# Patient Record
Sex: FEMALE | Race: WHITE | NOT HISPANIC OR LATINO | Employment: OTHER
[De-identification: names, ages, dates, MRNs, and addresses within clinical notes are randomized per-mention and may not be internally consistent; named-entity substitution may affect disease eponyms.]

---

## 2017-02-13 ENCOUNTER — BH HISTORICAL (OUTPATIENT)
Dept: OTHER | Age: 62
End: 2017-02-13

## 2017-08-14 ENCOUNTER — BH HISTORICAL (OUTPATIENT)
Dept: OTHER | Age: 62
End: 2017-08-14

## 2018-02-12 ENCOUNTER — BH HISTORICAL (OUTPATIENT)
Dept: OTHER | Age: 63
End: 2018-02-12

## 2018-08-28 ENCOUNTER — BH HISTORICAL (OUTPATIENT)
Dept: OTHER | Age: 63
End: 2018-08-28

## 2019-01-24 RX ORDER — OXCARBAZEPINE 150 MG/1
TABLET, FILM COATED ORAL
COMMUNITY
Start: 2018-08-09 | End: 2019-02-05 | Stop reason: SDUPTHER

## 2019-01-24 RX ORDER — BUPROPION HYDROCHLORIDE 300 MG/1
TABLET ORAL
COMMUNITY
Start: 2018-08-28 | End: 2019-02-26 | Stop reason: SDUPTHER

## 2019-02-05 ENCOUNTER — TELEPHONE (OUTPATIENT)
Dept: BEHAVIORAL HEALTH | Age: 64
End: 2019-02-05

## 2019-02-05 RX ORDER — OXCARBAZEPINE 150 MG/1
150 TABLET, FILM COATED ORAL 2 TIMES DAILY
Qty: 60 TABLET | Refills: 0 | Status: SHIPPED | OUTPATIENT
Start: 2019-02-05 | End: 2019-02-26 | Stop reason: SDUPTHER

## 2019-02-26 ENCOUNTER — BEHAVIORAL HEALTH (OUTPATIENT)
Dept: BEHAVIORAL HEALTH | Age: 64
End: 2019-02-26

## 2019-02-26 DIAGNOSIS — F41.1 GENERALIZED ANXIETY DISORDER: Primary | ICD-10-CM

## 2019-02-26 DIAGNOSIS — F33.41 RECURRENT MAJOR DEPRESSIVE DISORDER, IN PARTIAL REMISSION (CMD): ICD-10-CM

## 2019-02-26 PROCEDURE — 99213 OFFICE O/P EST LOW 20 MIN: CPT | Performed by: PSYCHIATRY & NEUROLOGY

## 2019-02-26 RX ORDER — OXCARBAZEPINE 150 MG/1
150 TABLET, FILM COATED ORAL 2 TIMES DAILY
Qty: 180 TABLET | Refills: 1 | Status: SHIPPED | OUTPATIENT
Start: 2019-02-26 | End: 2019-05-27

## 2019-02-26 RX ORDER — BUPROPION HYDROCHLORIDE 300 MG/1
300 TABLET ORAL EVERY MORNING
Qty: 90 TABLET | Refills: 1 | Status: SHIPPED | OUTPATIENT
Start: 2019-02-26 | End: 2019-08-20 | Stop reason: SDUPTHER

## 2019-05-23 ENCOUNTER — APPOINTMENT (OUTPATIENT)
Dept: BEHAVIORAL HEALTH | Age: 64
End: 2019-05-23

## 2019-08-14 ENCOUNTER — TELEPHONE (OUTPATIENT)
Dept: BEHAVIORAL HEALTH | Age: 64
End: 2019-08-14

## 2019-08-14 RX ORDER — OXCARBAZEPINE 150 MG/1
150 TABLET, FILM COATED ORAL 2 TIMES DAILY
Qty: 180 TABLET | Refills: 0 | Status: SHIPPED | OUTPATIENT
Start: 2019-08-14 | End: 2019-08-20 | Stop reason: SDUPTHER

## 2019-08-15 ENCOUNTER — TELEPHONE (OUTPATIENT)
Dept: BEHAVIORAL HEALTH | Age: 64
End: 2019-08-15

## 2019-08-20 ENCOUNTER — OFFICE VISIT (OUTPATIENT)
Dept: BEHAVIORAL HEALTH | Age: 64
End: 2019-08-20

## 2019-08-20 DIAGNOSIS — F31.76 BIPOLAR DISORDER, IN FULL REMISSION, MOST RECENT EPISODE DEPRESSED (CMD): Primary | ICD-10-CM

## 2019-08-20 PROCEDURE — 99213 OFFICE O/P EST LOW 20 MIN: CPT | Performed by: PSYCHIATRY & NEUROLOGY

## 2019-08-20 RX ORDER — OXCARBAZEPINE 150 MG/1
150 TABLET, FILM COATED ORAL 2 TIMES DAILY
Qty: 180 TABLET | Refills: 1 | Status: SHIPPED | OUTPATIENT
Start: 2019-08-20 | End: 2020-02-20 | Stop reason: SDUPTHER

## 2019-08-20 RX ORDER — BUPROPION HYDROCHLORIDE 300 MG/1
300 TABLET ORAL EVERY MORNING
Qty: 90 TABLET | Refills: 1 | Status: SHIPPED | OUTPATIENT
Start: 2019-08-20 | End: 2020-02-20 | Stop reason: SDUPTHER

## 2019-10-29 ENCOUNTER — OFFICE VISIT (OUTPATIENT)
Dept: FAMILY MEDICINE CLINIC | Facility: CLINIC | Age: 64
End: 2019-10-29
Payer: COMMERCIAL

## 2019-10-29 VITALS
WEIGHT: 197.81 LBS | DIASTOLIC BLOOD PRESSURE: 83 MMHG | HEIGHT: 65.5 IN | HEART RATE: 101 BPM | SYSTOLIC BLOOD PRESSURE: 137 MMHG | BODY MASS INDEX: 32.56 KG/M2 | TEMPERATURE: 98 F

## 2019-10-29 DIAGNOSIS — N18.30 CHRONIC KIDNEY DISEASE (CKD), STAGE III (MODERATE) (HCC): ICD-10-CM

## 2019-10-29 DIAGNOSIS — E28.39 ESTROGEN DEFICIENCY: ICD-10-CM

## 2019-10-29 DIAGNOSIS — I87.2 VENOUS INSUFFICIENCY OF BOTH LOWER EXTREMITIES: ICD-10-CM

## 2019-10-29 DIAGNOSIS — I10 ESSENTIAL HYPERTENSION: ICD-10-CM

## 2019-10-29 DIAGNOSIS — J30.89 ENVIRONMENTAL AND SEASONAL ALLERGIES: Primary | ICD-10-CM

## 2019-10-29 DIAGNOSIS — F31.70 BIPOLAR DISORDER IN FULL REMISSION, MOST RECENT EPISODE UNSPECIFIED TYPE (HCC): ICD-10-CM

## 2019-10-29 PROCEDURE — 99203 OFFICE O/P NEW LOW 30 MIN: CPT | Performed by: FAMILY MEDICINE

## 2019-10-29 RX ORDER — AMLODIPINE BESYLATE AND BENAZEPRIL HYDROCHLORIDE 10; 20 MG/1; MG/1
1 CAPSULE ORAL DAILY
COMMUNITY
Start: 2019-09-18 | End: 2019-12-03

## 2019-10-29 RX ORDER — CETIRIZINE HYDROCHLORIDE 10 MG/1
10 TABLET ORAL DAILY
COMMUNITY
Start: 2017-09-06

## 2019-10-29 RX ORDER — BUPROPION HYDROCHLORIDE 150 MG/1
150 TABLET ORAL DAILY
COMMUNITY
Start: 2019-07-23

## 2019-10-29 RX ORDER — MONTELUKAST SODIUM 10 MG/1
1 TABLET ORAL NIGHTLY
COMMUNITY
End: 2019-12-03

## 2019-10-29 RX ORDER — OXCARBAZEPINE 150 MG/1
150 TABLET, FILM COATED ORAL 2 TIMES DAILY
COMMUNITY
Start: 2019-07-23

## 2019-12-03 RX ORDER — MONTELUKAST SODIUM 10 MG/1
10 TABLET ORAL NIGHTLY
Qty: 90 TABLET | Refills: 1 | Status: SHIPPED | OUTPATIENT
Start: 2019-12-03 | End: 2020-06-17

## 2019-12-03 NOTE — TELEPHONE ENCOUNTER
Patient requesting refill for medication below and states out of medication and requesting refills on both medications. •  amLODIPine Besy-Benazepril HCl 10-20 MG Oral Cap, Take 1 capsule by mouth daily. , Disp: , Rfl:   •  Montelukast Sodium 10 MG Oral

## 2019-12-04 RX ORDER — AMLODIPINE BESYLATE AND BENAZEPRIL HYDROCHLORIDE 10; 20 MG/1; MG/1
1 CAPSULE ORAL DAILY
Qty: 90 CAPSULE | Refills: 1 | Status: SHIPPED | OUTPATIENT
Start: 2019-12-04 | End: 2020-07-21

## 2019-12-04 NOTE — TELEPHONE ENCOUNTER
Sent to [facility] out-of-office    Please review; protocol failed. D/t labs  Requested Prescriptions     Pending Prescriptions Disp Refills   • amLODIPine Besy-Benazepril HCl 10-20 MG Oral Cap  0     Sig: Take 1 capsule by mouth daily.    • Montelukast Sodium

## 2020-01-07 ENCOUNTER — OFFICE VISIT (OUTPATIENT)
Dept: FAMILY MEDICINE CLINIC | Facility: CLINIC | Age: 65
End: 2020-01-07
Payer: COMMERCIAL

## 2020-01-07 VITALS
RESPIRATION RATE: 19 BRPM | TEMPERATURE: 97 F | WEIGHT: 195 LBS | HEART RATE: 96 BPM | HEIGHT: 66 IN | DIASTOLIC BLOOD PRESSURE: 84 MMHG | SYSTOLIC BLOOD PRESSURE: 139 MMHG | BODY MASS INDEX: 31.34 KG/M2

## 2020-01-07 DIAGNOSIS — H92.02 LEFT EAR PAIN: Primary | ICD-10-CM

## 2020-01-07 DIAGNOSIS — M26.629 TMJ SYNDROME: ICD-10-CM

## 2020-01-07 DIAGNOSIS — H93.19 TINNITUS, UNSPECIFIED LATERALITY: ICD-10-CM

## 2020-01-07 PROCEDURE — 99213 OFFICE O/P EST LOW 20 MIN: CPT | Performed by: FAMILY MEDICINE

## 2020-01-07 RX ORDER — METHYLPREDNISOLONE 4 MG/1
TABLET ORAL
Qty: 1 KIT | Refills: 0 | Status: SHIPPED | OUTPATIENT
Start: 2020-01-07 | End: 2020-02-27 | Stop reason: ALTCHOICE

## 2020-01-07 RX ORDER — FLUTICASONE PROPIONATE 50 MCG
SPRAY, SUSPENSION (ML) NASAL DAILY
COMMUNITY

## 2020-01-07 NOTE — PROGRESS NOTES
HPI:    Loyda Beltran is a 60year old female presents to clinic with left-sided ear pain that started on 12/15/2019. Patient was singing in a concert, when the trombone was loudly playing next to her and she started to experience ear pain.   Reports that ear and ear canal normal.   Nose: Nose normal.   Mouth/Throat: Uvula is midline, oropharynx is clear and moist and mucous membranes are normal.   Eyes: Pupils are equal, round, and reactive to light.  Conjunctivae and EOM are normal.   Neck: Normal range of

## 2020-01-09 ENCOUNTER — OFFICE VISIT (OUTPATIENT)
Dept: OTOLARYNGOLOGY | Facility: CLINIC | Age: 65
End: 2020-01-09
Payer: COMMERCIAL

## 2020-01-09 VITALS
SYSTOLIC BLOOD PRESSURE: 138 MMHG | WEIGHT: 195 LBS | BODY MASS INDEX: 31.34 KG/M2 | TEMPERATURE: 97 F | HEIGHT: 66 IN | DIASTOLIC BLOOD PRESSURE: 84 MMHG

## 2020-01-09 DIAGNOSIS — H92.02 LEFT EAR PAIN: Primary | ICD-10-CM

## 2020-01-09 PROCEDURE — 99243 OFF/OP CNSLTJ NEW/EST LOW 30: CPT | Performed by: OTOLARYNGOLOGY

## 2020-01-09 RX ORDER — MELOXICAM 15 MG/1
15 TABLET ORAL DAILY
Qty: 30 TABLET | Refills: 3 | Status: SHIPPED | OUTPATIENT
Start: 2020-01-09 | End: 2020-08-04 | Stop reason: ALTCHOICE

## 2020-01-09 RX ORDER — CYCLOBENZAPRINE HCL 5 MG
5 TABLET ORAL 3 TIMES DAILY PRN
Qty: 90 TABLET | Refills: 0 | Status: SHIPPED | OUTPATIENT
Start: 2020-01-09 | End: 2020-02-27 | Stop reason: ALTCHOICE

## 2020-01-09 NOTE — PROGRESS NOTES
Enoch Marty is a 60year old female. Patient presents with:  Ear Problem: c/o left ear pain for 3 weeks, ringing in left ear for 1 year      HISTORY OF PRESENT ILLNESS  She presents with 2 complaints. She has had ringing in her left ear for about 1 year. m)   Wt 195 lb (88.5 kg)   BMI 31.47 kg/m²        Constitutional Normal Overall appearance - Normal.   Psychiatric Normal Orientation - Oriented to time, place, person & situation. Appropriate mood and affect.    Neck Exam Normal Inspection - Normal. Palpat 24 Hr, Take 150 mg by mouth daily. , Disp: , Rfl:   •  cetirizine 10 MG Oral Tab, Take 10 mg by mouth daily. , Disp: , Rfl:   •  OXcarbazepine 150 MG Oral Tab, Take 150 mg by mouth 2 (two) times daily. , Disp: , Rfl:   •  methylPREDNISolone (MEDROL) 4 MG Oral

## 2020-02-13 ENCOUNTER — TELEPHONE (OUTPATIENT)
Dept: OTOLARYNGOLOGY | Facility: CLINIC | Age: 65
End: 2020-02-13

## 2020-02-13 DIAGNOSIS — H92.02 LEFT EAR PAIN: Primary | ICD-10-CM

## 2020-02-20 ENCOUNTER — OFFICE VISIT (OUTPATIENT)
Dept: BEHAVIORAL HEALTH | Age: 65
End: 2020-02-20

## 2020-02-20 DIAGNOSIS — F31.63 BIPOLAR 1 DISORDER, MIXED, SEVERE (CMD): Primary | ICD-10-CM

## 2020-02-20 DIAGNOSIS — E66.9 OBESITY (BMI 30-39.9): ICD-10-CM

## 2020-02-20 PROCEDURE — 99214 OFFICE O/P EST MOD 30 MIN: CPT | Performed by: PSYCHIATRY & NEUROLOGY

## 2020-02-20 RX ORDER — OXCARBAZEPINE 150 MG/1
150 TABLET, FILM COATED ORAL 2 TIMES DAILY
Qty: 180 TABLET | Refills: 1 | Status: SHIPPED | OUTPATIENT
Start: 2020-02-20 | End: 2020-02-25 | Stop reason: SDUPTHER

## 2020-02-20 RX ORDER — BUPROPION HYDROCHLORIDE 150 MG/1
150 TABLET ORAL DAILY
Qty: 90 TABLET | Refills: 1 | Status: SHIPPED | OUTPATIENT
Start: 2020-02-20 | End: 2020-02-25 | Stop reason: SDUPTHER

## 2020-02-20 RX ORDER — BUPROPION HYDROCHLORIDE 150 MG/1
150 TABLET ORAL
COMMUNITY
Start: 2019-07-23 | End: 2020-02-20 | Stop reason: SDUPTHER

## 2020-02-24 NOTE — TELEPHONE ENCOUNTER
LMTCB-Prior auth approved for hearing test, [id number] (valid from 02/13/20-3/11/20).  Please help pt schedule

## 2020-02-25 ENCOUNTER — TELEPHONE (OUTPATIENT)
Dept: BEHAVIORAL HEALTH | Age: 65
End: 2020-02-25

## 2020-02-25 RX ORDER — BUPROPION HYDROCHLORIDE 150 MG/1
150 TABLET ORAL DAILY
Qty: 90 TABLET | Refills: 1 | Status: SHIPPED | OUTPATIENT
Start: 2020-02-25 | End: 2020-07-30 | Stop reason: SDUPTHER

## 2020-02-25 RX ORDER — OXCARBAZEPINE 150 MG/1
150 TABLET, FILM COATED ORAL 2 TIMES DAILY
Qty: 180 TABLET | Refills: 1 | Status: SHIPPED | OUTPATIENT
Start: 2020-02-25 | End: 2020-07-30 | Stop reason: SDUPTHER

## 2020-02-27 ENCOUNTER — OFFICE VISIT (OUTPATIENT)
Dept: FAMILY MEDICINE CLINIC | Facility: CLINIC | Age: 65
End: 2020-02-27
Payer: COMMERCIAL

## 2020-02-27 VITALS
SYSTOLIC BLOOD PRESSURE: 132 MMHG | BODY MASS INDEX: 33.19 KG/M2 | HEART RATE: 103 BPM | TEMPERATURE: 97 F | DIASTOLIC BLOOD PRESSURE: 85 MMHG | RESPIRATION RATE: 20 BRPM | WEIGHT: 201.63 LBS | HEIGHT: 65.25 IN

## 2020-02-27 DIAGNOSIS — Z12.11 COLON CANCER SCREENING: ICD-10-CM

## 2020-02-27 DIAGNOSIS — H53.452 LOSS OF PERIPHERAL VISUAL FIELD, LEFT: ICD-10-CM

## 2020-02-27 DIAGNOSIS — Z00.00 ROUTINE PHYSICAL EXAMINATION: Primary | ICD-10-CM

## 2020-02-27 DIAGNOSIS — Z12.31 ENCOUNTER FOR SCREENING MAMMOGRAM FOR BREAST CANCER: ICD-10-CM

## 2020-02-27 DIAGNOSIS — H93.19 TINNITUS, UNSPECIFIED LATERALITY: ICD-10-CM

## 2020-02-27 DIAGNOSIS — H57.12 EYE PAIN, LEFT: ICD-10-CM

## 2020-02-27 DIAGNOSIS — M26.629 TMJ SYNDROME: ICD-10-CM

## 2020-02-27 DIAGNOSIS — F31.70 BIPOLAR DISORDER IN FULL REMISSION, MOST RECENT EPISODE UNSPECIFIED TYPE (HCC): ICD-10-CM

## 2020-02-27 PROCEDURE — 99396 PREV VISIT EST AGE 40-64: CPT | Performed by: FAMILY MEDICINE

## 2020-02-27 NOTE — PROGRESS NOTES
HPI:    Patient ID: Leonel Payan is a 60year old female. HPI    Review of Systems   Constitutional: Negative. HENT: Positive for tinnitus. Eyes: Positive for pain and visual disturbance. Respiratory: Negative. Cardiovascular: Negative.     Gas daughter. Exercises with walking. Will return for fasting labs. Recommend Shingrix vaccine but patient will check on insurance coverage.     Encounter for screening mammogram for breast cancer    Colon cancer screening–reviewed pros and cons of FIT testi

## 2020-03-03 ENCOUNTER — TELEPHONE (OUTPATIENT)
Dept: FAMILY MEDICINE CLINIC | Facility: CLINIC | Age: 65
End: 2020-03-03

## 2020-03-03 ENCOUNTER — TELEPHONE (OUTPATIENT)
Dept: OPHTHALMOLOGY | Facility: CLINIC | Age: 65
End: 2020-03-03

## 2020-03-03 DIAGNOSIS — H57.12 EYE PAIN, LEFT: Primary | ICD-10-CM

## 2020-03-03 NOTE — TELEPHONE ENCOUNTER
Patient stated   Pallavi Horvath MD     Does not accept her insurance, so she will need a new referral to see Dr. Farzana Michelle. Patient has an appointment with Dr. Farzana Michelle on Thursday 3/5. Thank you.

## 2020-03-03 NOTE — TELEPHONE ENCOUNTER
Dr. Porter Ritchie, patient is requesting a referral for Dr. Connor Womack. Referral has been pended, please advise.      Please reply to pool: EM TRIAGE SUPPORT

## 2020-03-03 NOTE — TELEPHONE ENCOUNTER
Spoke with patient. She feels like she is losing her vision in the left peripherally for the past 1 month. She also pain and dryness in the left eye for the past 1 year.   She complains of \"constant pain\" in and around OS of a 5/10 for the past 1 year

## 2020-03-04 NOTE — TELEPHONE ENCOUNTER
Left detailed message informing patient of order placed. Left call back number if further assistance needed.

## 2020-03-05 ENCOUNTER — OFFICE VISIT (OUTPATIENT)
Dept: AUDIOLOGY | Facility: CLINIC | Age: 65
End: 2020-03-05
Payer: COMMERCIAL

## 2020-03-05 ENCOUNTER — OFFICE VISIT (OUTPATIENT)
Dept: OPHTHALMOLOGY | Facility: CLINIC | Age: 65
End: 2020-03-05
Payer: COMMERCIAL

## 2020-03-05 ENCOUNTER — LAB ENCOUNTER (OUTPATIENT)
Dept: LAB | Facility: HOSPITAL | Age: 65
End: 2020-03-05
Attending: FAMILY MEDICINE
Payer: COMMERCIAL

## 2020-03-05 DIAGNOSIS — H04.123 BILATERAL DRY EYES: Primary | ICD-10-CM

## 2020-03-05 DIAGNOSIS — Z96.1 PSEUDOPHAKIA OF BOTH EYES: ICD-10-CM

## 2020-03-05 DIAGNOSIS — H93.12 TINNITUS OF LEFT EAR: Primary | ICD-10-CM

## 2020-03-05 DIAGNOSIS — Z00.00 ROUTINE PHYSICAL EXAMINATION: ICD-10-CM

## 2020-03-05 DIAGNOSIS — H53.10 SUBJECTIVE VISUAL DISTURBANCE OF BOTH EYES: ICD-10-CM

## 2020-03-05 DIAGNOSIS — H26.493 AFTER CATARACT NOT OBSCURING VISION, BILATERAL: ICD-10-CM

## 2020-03-05 DIAGNOSIS — H43.393 FLOATER, VITREOUS, BILATERAL: ICD-10-CM

## 2020-03-05 DIAGNOSIS — F31.70 BIPOLAR DISORDER IN FULL REMISSION, MOST RECENT EPISODE UNSPECIFIED TYPE (HCC): ICD-10-CM

## 2020-03-05 PROBLEM — H93.13 TINNITUS OF BOTH EARS: Status: ACTIVE | Noted: 2020-03-05

## 2020-03-05 LAB
ALBUMIN SERPL-MCNC: 3.8 G/DL (ref 3.4–5)
ALBUMIN/GLOB SERPL: 1.2 {RATIO} (ref 1–2)
ALP LIVER SERPL-CCNC: 102 U/L (ref 50–130)
ALT SERPL-CCNC: 29 U/L (ref 13–56)
ANION GAP SERPL CALC-SCNC: 6 MMOL/L (ref 0–18)
AST SERPL-CCNC: 20 U/L (ref 15–37)
BASOPHILS # BLD AUTO: 0.05 X10(3) UL (ref 0–0.2)
BASOPHILS NFR BLD AUTO: 0.8 %
BILIRUB SERPL-MCNC: 0.6 MG/DL (ref 0.1–2)
BUN BLD-MCNC: 26 MG/DL (ref 7–18)
BUN/CREAT SERPL: 21.1 (ref 10–20)
CALCIUM BLD-MCNC: 9.9 MG/DL (ref 8.5–10.1)
CHLORIDE SERPL-SCNC: 108 MMOL/L (ref 98–112)
CHOLEST SMN-MCNC: 250 MG/DL (ref ?–200)
CO2 SERPL-SCNC: 26 MMOL/L (ref 21–32)
CREAT BLD-MCNC: 1.23 MG/DL (ref 0.55–1.02)
DEPRECATED RDW RBC AUTO: 41 FL (ref 35.1–46.3)
EOSINOPHIL # BLD AUTO: 0.18 X10(3) UL (ref 0–0.7)
EOSINOPHIL NFR BLD AUTO: 2.9 %
ERYTHROCYTE [DISTWIDTH] IN BLOOD BY AUTOMATED COUNT: 12.8 % (ref 11–15)
GLOBULIN PLAS-MCNC: 3.3 G/DL (ref 2.8–4.4)
GLUCOSE BLD-MCNC: 78 MG/DL (ref 70–99)
HCT VFR BLD AUTO: 40 % (ref 35–48)
HDLC SERPL-MCNC: 72 MG/DL (ref 40–59)
HGB BLD-MCNC: 13.6 G/DL (ref 12–16)
IMM GRANULOCYTES # BLD AUTO: 0.02 X10(3) UL (ref 0–1)
IMM GRANULOCYTES NFR BLD: 0.3 %
LDLC SERPL CALC-MCNC: 161 MG/DL (ref ?–100)
LYMPHOCYTES # BLD AUTO: 1.92 X10(3) UL (ref 1–4)
LYMPHOCYTES NFR BLD AUTO: 31.1 %
M PROTEIN MFR SERPL ELPH: 7.1 G/DL (ref 6.4–8.2)
MCH RBC QN AUTO: 29.9 PG (ref 26–34)
MCHC RBC AUTO-ENTMCNC: 34 G/DL (ref 31–37)
MCV RBC AUTO: 87.9 FL (ref 80–100)
MONOCYTES # BLD AUTO: 0.6 X10(3) UL (ref 0.1–1)
MONOCYTES NFR BLD AUTO: 9.7 %
NEUTROPHILS # BLD AUTO: 3.4 X10 (3) UL (ref 1.5–7.7)
NEUTROPHILS # BLD AUTO: 3.4 X10(3) UL (ref 1.5–7.7)
NEUTROPHILS NFR BLD AUTO: 55.2 %
NONHDLC SERPL-MCNC: 178 MG/DL (ref ?–130)
OSMOLALITY SERPL CALC.SUM OF ELEC: 294 MOSM/KG (ref 275–295)
PATIENT FASTING Y/N/NP: YES
PATIENT FASTING Y/N/NP: YES
PLATELET # BLD AUTO: 275 10(3)UL (ref 150–450)
POTASSIUM SERPL-SCNC: 4.2 MMOL/L (ref 3.5–5.1)
RBC # BLD AUTO: 4.55 X10(6)UL (ref 3.8–5.3)
SODIUM SERPL-SCNC: 140 MMOL/L (ref 136–145)
TRIGL SERPL-MCNC: 83 MG/DL (ref 30–149)
TSI SER-ACNC: 2.26 MIU/ML (ref 0.36–3.74)
VLDLC SERPL CALC-MCNC: 17 MG/DL (ref 0–30)
WBC # BLD AUTO: 6.2 X10(3) UL (ref 4–11)

## 2020-03-05 PROCEDURE — 99243 OFF/OP CNSLTJ NEW/EST LOW 30: CPT | Performed by: OPHTHALMOLOGY

## 2020-03-05 PROCEDURE — 92015 DETERMINE REFRACTIVE STATE: CPT | Performed by: OPHTHALMOLOGY

## 2020-03-05 PROCEDURE — 84443 ASSAY THYROID STIM HORMONE: CPT

## 2020-03-05 PROCEDURE — 92567 TYMPANOMETRY: CPT | Performed by: AUDIOLOGIST

## 2020-03-05 PROCEDURE — 92557 COMPREHENSIVE HEARING TEST: CPT | Performed by: AUDIOLOGIST

## 2020-03-05 PROCEDURE — 36415 COLL VENOUS BLD VENIPUNCTURE: CPT

## 2020-03-05 PROCEDURE — 80061 LIPID PANEL: CPT

## 2020-03-05 PROCEDURE — 80053 COMPREHEN METABOLIC PANEL: CPT

## 2020-03-05 PROCEDURE — 85025 COMPLETE CBC W/AUTO DIFF WBC: CPT

## 2020-03-05 NOTE — ASSESSMENT & PLAN NOTE
Due to patient feeling like she has lost peripheral vision in the left eye, will have patient back in the near future for a 120 point visual field screening with no MD.  If 120 point is normal then no further testing, if abnormal, then add 30-2 OU

## 2020-03-05 NOTE — PROGRESS NOTES
Leonel Payan is a 60year old female. HPI:     HPI     Consult      Additional comments: Dr. Almaz Young               Comments     Pt complains of peripheral vision loss on both sides of her vision x 2-3 weeks.  Pt states that she has been bumping into people daily. 90 capsule 1   • Montelukast Sodium 10 MG Oral Tab Take 1 tablet (10 mg total) by mouth nightly. 90 tablet 1   • buPROPion HCl ER, XL, 150 MG Oral Tablet 24 Hr Take 150 mg by mouth daily. • cetirizine 10 MG Oral Tab Take 10 mg by mouth daily. mm 7 mm    Anesthesia:  Yes   3/5/2020             Refraction     Wearing Rx       Sphere Cylinder    Right +2.75 Sphere    Left +2.75 Sphere    Type:  OTC reading only          Manifest Refraction (Auto)       Sphere Cylinder [place] [facility] add 30-2 OU     After cataract not obscuring vision, bilateral  No treatment is needed at this time.         Orders Placed This Encounter      *FUTURE VF- OU - Both Eyes      Meds This Visit:  Requested Prescriptions      No prescriptions requested or order

## 2020-03-05 NOTE — ASSESSMENT & PLAN NOTE
Schirmer's test today revealed dry eyes. Patient was instructed to use warm compresses to the eyelids twice a day everyday.     Instructions for warm compress use:   Patient should place wash compresses on both eyelids for 5 minutes every morning and osiris

## 2020-03-05 NOTE — PROGRESS NOTES
AUDIOLOGY REPORT      Cathy Carr is a 60year old female     Referring Provider: Da Green   YOB: 1955  Medical Record: [id number]      Patient Hearing History:  Patient referred by Dr. Magalis Coppola for left ear tinnitus for about 2 years.   Sh

## 2020-03-06 NOTE — PROGRESS NOTES
Please let her know that her hearing test showed normal hearing at all frequencies and no abnormalities to explain the ringing in her ears. The ringing could be related to her underlying TMJ issues.   I have asked her to start certain medications and to re

## 2020-03-07 ENCOUNTER — TELEPHONE (OUTPATIENT)
Dept: OTOLARYNGOLOGY | Facility: CLINIC | Age: 65
End: 2020-03-07

## 2020-03-07 NOTE — TELEPHONE ENCOUNTER
pt informed of note below, per pt never took meloxicam. Pt asking if she would need to see oral surgeon for TMJ ? Pt states she will call her insurance to see who  Is within network, per pt she will not have dental insurance until June.

## 2020-03-07 NOTE — TELEPHONE ENCOUNTER
Author: Peggy Brown MD Service: Radha Kirby Type: Physician   Filed: 3/6/2020  1:19 AM Encounter Date: 3/5/2020 Status: Signed   : Peggy Brown MD (Physician)        Show:Clear all  [x]Manual[]Template[]Copied    Added by:  Jeremy Sepulveda MD

## 2020-03-18 ENCOUNTER — TELEPHONE (OUTPATIENT)
Dept: FAMILY MEDICINE | Age: 65
End: 2020-03-18

## 2020-06-17 RX ORDER — MONTELUKAST SODIUM 10 MG/1
TABLET ORAL
Qty: 90 TABLET | Refills: 3 | Status: SHIPPED | OUTPATIENT
Start: 2020-06-17 | End: 2021-06-18

## 2020-07-21 ENCOUNTER — TELEPHONE (OUTPATIENT)
Dept: FAMILY MEDICINE CLINIC | Facility: CLINIC | Age: 65
End: 2020-07-21

## 2020-07-21 RX ORDER — AMLODIPINE BESYLATE AND BENAZEPRIL HYDROCHLORIDE 10; 20 MG/1; MG/1
1 CAPSULE ORAL DAILY
Qty: 90 CAPSULE | Refills: 1 | Status: SHIPPED | OUTPATIENT
Start: 2020-07-21 | End: 2020-08-04

## 2020-07-22 NOTE — TELEPHONE ENCOUNTER
Called patient no answer left detailed message in regards to medication sent to the pharmacy, and to schedule an appointment in 6 months for her hypertension.

## 2020-07-22 NOTE — TELEPHONE ENCOUNTER
Please let patient know I sent refill to pharmacy, she is due next month for 6-month hypertension follow-up. Remind her to check whether Shingrix is covered prior to that visit. Also please confirm Rx sent to correct pharmacy.

## 2020-07-30 ENCOUNTER — OFFICE VISIT (OUTPATIENT)
Dept: BEHAVIORAL HEALTH | Age: 65
End: 2020-07-30

## 2020-07-30 DIAGNOSIS — F33.41 RECURRENT MAJOR DEPRESSIVE DISORDER, IN PARTIAL REMISSION (CMD): ICD-10-CM

## 2020-07-30 DIAGNOSIS — E66.9 OBESITY (BMI 30-39.9): ICD-10-CM

## 2020-07-30 DIAGNOSIS — F31.63 BIPOLAR 1 DISORDER, MIXED, SEVERE (CMD): Primary | ICD-10-CM

## 2020-07-30 DIAGNOSIS — F41.1 GENERALIZED ANXIETY DISORDER: ICD-10-CM

## 2020-07-30 PROCEDURE — 99214 OFFICE O/P EST MOD 30 MIN: CPT | Performed by: PSYCHIATRY & NEUROLOGY

## 2020-07-30 RX ORDER — OXCARBAZEPINE 150 MG/1
150 TABLET, FILM COATED ORAL 2 TIMES DAILY
Qty: 180 TABLET | Refills: 1 | Status: SHIPPED | OUTPATIENT
Start: 2020-07-30 | End: 2021-02-18 | Stop reason: SDUPTHER

## 2020-07-30 RX ORDER — BUPROPION HYDROCHLORIDE 150 MG/1
150 TABLET ORAL DAILY
Qty: 90 TABLET | Refills: 1 | Status: SHIPPED | OUTPATIENT
Start: 2020-07-30 | End: 2021-02-18 | Stop reason: SDUPTHER

## 2020-08-04 ENCOUNTER — OFFICE VISIT (OUTPATIENT)
Dept: FAMILY MEDICINE CLINIC | Facility: CLINIC | Age: 65
End: 2020-08-04
Payer: COMMERCIAL

## 2020-08-04 VITALS
DIASTOLIC BLOOD PRESSURE: 72 MMHG | TEMPERATURE: 98 F | HEIGHT: 65.5 IN | SYSTOLIC BLOOD PRESSURE: 112 MMHG | BODY MASS INDEX: 29.41 KG/M2 | RESPIRATION RATE: 16 BRPM | HEART RATE: 76 BPM | WEIGHT: 178.63 LBS

## 2020-08-04 DIAGNOSIS — H53.452 LOSS OF PERIPHERAL VISUAL FIELD, LEFT: ICD-10-CM

## 2020-08-04 DIAGNOSIS — I10 ESSENTIAL HYPERTENSION: Primary | ICD-10-CM

## 2020-08-04 PROCEDURE — 3008F BODY MASS INDEX DOCD: CPT | Performed by: FAMILY MEDICINE

## 2020-08-04 PROCEDURE — 99213 OFFICE O/P EST LOW 20 MIN: CPT | Performed by: FAMILY MEDICINE

## 2020-08-04 PROCEDURE — 3074F SYST BP LT 130 MM HG: CPT | Performed by: FAMILY MEDICINE

## 2020-08-04 PROCEDURE — 3078F DIAST BP <80 MM HG: CPT | Performed by: FAMILY MEDICINE

## 2020-08-04 RX ORDER — AMLODIPINE BESYLATE AND BENAZEPRIL HYDROCHLORIDE 5; 10 MG/1; MG/1
1 CAPSULE ORAL DAILY
Qty: 90 CAPSULE | Refills: 3 | Status: SHIPPED | OUTPATIENT
Start: 2020-08-04 | End: 2021-06-18

## 2020-08-04 NOTE — PROGRESS NOTES
HPI:    Patient ID: Tim Calderón is a 63year old female. Hypertension   This is a chronic problem. The current episode started more than 1 year ago. The problem has been gradually improving since onset. The problem is controlled.  Pertinent negatives inc Blood pressure much improved. Also, she has been living with her mother with dementia and now with her .   She realizes this is better for her psychologically, and is in discussions about whether she will move back with her  as her mother ent

## 2020-08-06 ENCOUNTER — TELEPHONE (OUTPATIENT)
Dept: OPHTHALMOLOGY | Facility: CLINIC | Age: 65
End: 2020-08-06

## 2020-08-06 NOTE — TELEPHONE ENCOUNTER
Pt wanted to RS VF testing that had to be rescheduled due to COVID-19.  Scheduled on 9/5/2020 @ 10:00am

## 2020-08-26 ENCOUNTER — NURSE TRIAGE (OUTPATIENT)
Dept: FAMILY MEDICINE CLINIC | Facility: CLINIC | Age: 65
End: 2020-08-26

## 2020-08-26 ENCOUNTER — OFFICE VISIT (OUTPATIENT)
Dept: FAMILY MEDICINE CLINIC | Facility: CLINIC | Age: 65
End: 2020-08-26
Payer: COMMERCIAL

## 2020-08-26 VITALS
HEART RATE: 76 BPM | WEIGHT: 176.5 LBS | DIASTOLIC BLOOD PRESSURE: 82 MMHG | TEMPERATURE: 98 F | HEIGHT: 65.5 IN | SYSTOLIC BLOOD PRESSURE: 118 MMHG | BODY MASS INDEX: 29.05 KG/M2

## 2020-08-26 DIAGNOSIS — T63.441A BEE STING REACTION, ACCIDENTAL OR UNINTENTIONAL, INITIAL ENCOUNTER: Primary | ICD-10-CM

## 2020-08-26 PROCEDURE — 3079F DIAST BP 80-89 MM HG: CPT | Performed by: FAMILY MEDICINE

## 2020-08-26 PROCEDURE — 3008F BODY MASS INDEX DOCD: CPT | Performed by: FAMILY MEDICINE

## 2020-08-26 PROCEDURE — 3074F SYST BP LT 130 MM HG: CPT | Performed by: FAMILY MEDICINE

## 2020-08-26 PROCEDURE — 99212 OFFICE O/P EST SF 10 MIN: CPT | Performed by: FAMILY MEDICINE

## 2020-08-26 NOTE — PROGRESS NOTES
HPI:    Patient ID: Mackenzie Burgos is a 63year old female. Insect Bite   This is a new problem. The current episode started yesterday. The problem is unchanged. The affected locations include the left upper leg.  The rash is characterized by redness and it or ordered in this encounter       Imaging & Referrals:  None       #[id number]

## 2020-08-26 NOTE — TELEPHONE ENCOUNTER
Action Requested: Summary for Provider     []  Critical Lab, Recommendations Needed  [] Need Additional Advice  []   FYI    []   Need Orders  [] Need Medications Sent to Pharmacy  []  Other     SUMMARY: pt was stung by a bee yesterday.   Immediately applied

## 2020-09-02 ENCOUNTER — NURSE TRIAGE (OUTPATIENT)
Dept: FAMILY MEDICINE CLINIC | Facility: CLINIC | Age: 65
End: 2020-09-02

## 2020-09-02 DIAGNOSIS — M79.672 FOOT PAIN, BILATERAL: Primary | ICD-10-CM

## 2020-09-02 DIAGNOSIS — M79.671 FOOT PAIN, BILATERAL: Primary | ICD-10-CM

## 2020-09-02 NOTE — TELEPHONE ENCOUNTER
Action Requested: Summary for Provider     []  Critical Lab, Recommendations Needed  [] Need Additional Advice  []   FYI    []   Need Orders  [] Need Medications Sent to Pharmacy  []  Other     SUMMARY: pt going to ER for further eval.    Pt reports right

## 2020-09-03 NOTE — TELEPHONE ENCOUNTER
Please check with patient if her primary problem is foot swelling, come see me. If it is foot pain then make appointment with podiatry, referral done.

## 2020-09-03 NOTE — TELEPHONE ENCOUNTER
Patient calling in follow up to her ER visit yesterday. Imaging only showed a bone spur. She was told to follow up with her PCP. Do you want us to schedule with you or refer to podiatry?

## 2020-09-03 NOTE — TELEPHONE ENCOUNTER
Spoke with patient ( verified) and relayed Dr. Carlos Garcia message below--patient verbalizes understanding and agreement. She reports foot swelling has gone down--is wearing compression socks. \"It's definitely pain--it's hard to walk on my heel. \"

## 2020-09-05 ENCOUNTER — NURSE ONLY (OUTPATIENT)
Dept: OPHTHALMOLOGY | Facility: CLINIC | Age: 65
End: 2020-09-05
Payer: MEDICAID

## 2020-09-05 DIAGNOSIS — H53.10 SUBJECTIVE VISUAL DISTURBANCE OF BOTH EYES: Primary | ICD-10-CM

## 2020-09-05 PROCEDURE — 92083 EXTENDED VISUAL FIELD XM: CPT | Performed by: OPHTHALMOLOGY

## 2020-09-06 NOTE — ASSESSMENT & PLAN NOTE
Normal visual field, right eye. Nasal visual field defect, left eye. Please contact Dr. Meron Welch to have an MRI of brain and orbits with and without contrast to rule out compressive lesion of left optic nerve.

## 2020-09-06 NOTE — PROGRESS NOTES
Cathy Carr is a 63year old female. HPI:     HPI     Patient is here for a 120 pt HVF due to subjective visual disturbance.      Last edited by Fredy Beltran on 9/5/2020  9:58 AM. (History)        Patient History:  Past Medical History:   Diagnosis Salbador Eyes      Wood Visual Field - OU - Both Eyes      Meds This Visit:  Requested Prescriptions      No prescriptions requested or ordered in this encounter        Follow up instructions:  Return in about 6 months (around 3/5/2021) for Dilated exam.    9/6

## 2020-09-10 ENCOUNTER — TELEPHONE (OUTPATIENT)
Dept: FAMILY MEDICINE CLINIC | Facility: CLINIC | Age: 65
End: 2020-09-10

## 2020-09-10 ENCOUNTER — OFFICE VISIT (OUTPATIENT)
Dept: FAMILY MEDICINE CLINIC | Facility: CLINIC | Age: 65
End: 2020-09-10
Payer: MEDICAID

## 2020-09-10 VITALS
RESPIRATION RATE: 16 BRPM | HEIGHT: 65.5 IN | WEIGHT: 177.63 LBS | TEMPERATURE: 97 F | SYSTOLIC BLOOD PRESSURE: 110 MMHG | BODY MASS INDEX: 29.24 KG/M2 | HEART RATE: 97 BPM | DIASTOLIC BLOOD PRESSURE: 71 MMHG

## 2020-09-10 DIAGNOSIS — I87.2 VENOUS INSUFFICIENCY OF BOTH LOWER EXTREMITIES: ICD-10-CM

## 2020-09-10 DIAGNOSIS — M79.89 LEG SWELLING: ICD-10-CM

## 2020-09-10 DIAGNOSIS — L82.1 SEBORRHEIC KERATOSES: ICD-10-CM

## 2020-09-10 DIAGNOSIS — H53.452 LOSS OF PERIPHERAL VISUAL FIELD, LEFT: Primary | ICD-10-CM

## 2020-09-10 LAB
ALBUMIN SERPL-MCNC: 3.8 G/DL (ref 3.4–5)
ALBUMIN/GLOB SERPL: 1.2 {RATIO} (ref 1–2)
ALP LIVER SERPL-CCNC: 100 U/L (ref 50–130)
ALT SERPL-CCNC: 27 U/L (ref 13–56)
ANION GAP SERPL CALC-SCNC: 5 MMOL/L (ref 0–18)
AST SERPL-CCNC: 17 U/L (ref 15–37)
BILIRUB SERPL-MCNC: 0.6 MG/DL (ref 0.1–2)
BILIRUB UR QL: NEGATIVE
BUN BLD-MCNC: 21 MG/DL (ref 7–18)
BUN/CREAT SERPL: 17.1 (ref 10–20)
CALCIUM BLD-MCNC: 9.6 MG/DL (ref 8.5–10.1)
CHLORIDE SERPL-SCNC: 107 MMOL/L (ref 98–112)
CO2 SERPL-SCNC: 27 MMOL/L (ref 21–32)
COLOR UR: YELLOW
CREAT BLD-MCNC: 1.23 MG/DL (ref 0.55–1.02)
GLOBULIN PLAS-MCNC: 3.3 G/DL (ref 2.8–4.4)
GLUCOSE BLD-MCNC: 77 MG/DL (ref 70–99)
GLUCOSE UR-MCNC: NEGATIVE MG/DL
HGB UR QL STRIP.AUTO: NEGATIVE
KETONES UR-MCNC: NEGATIVE MG/DL
M PROTEIN MFR SERPL ELPH: 7.1 G/DL (ref 6.4–8.2)
NITRITE UR QL STRIP.AUTO: NEGATIVE
OSMOLALITY SERPL CALC.SUM OF ELEC: 290 MOSM/KG (ref 275–295)
PATIENT FASTING Y/N/NP: NO
PH UR: 6 [PH] (ref 5–8)
POTASSIUM SERPL-SCNC: 4 MMOL/L (ref 3.5–5.1)
PROT UR-MCNC: NEGATIVE MG/DL
RBC #/AREA URNS AUTO: 1 /HPF
SODIUM SERPL-SCNC: 139 MMOL/L (ref 136–145)
SP GR UR STRIP: 1.01 (ref 1–1.03)
UROBILINOGEN UR STRIP-ACNC: <2
WBC #/AREA URNS AUTO: 5 /HPF

## 2020-09-10 PROCEDURE — 36415 COLL VENOUS BLD VENIPUNCTURE: CPT | Performed by: FAMILY MEDICINE

## 2020-09-10 PROCEDURE — 90686 IIV4 VACC NO PRSV 0.5 ML IM: CPT | Performed by: FAMILY MEDICINE

## 2020-09-10 PROCEDURE — 3074F SYST BP LT 130 MM HG: CPT | Performed by: FAMILY MEDICINE

## 2020-09-10 PROCEDURE — 99214 OFFICE O/P EST MOD 30 MIN: CPT | Performed by: FAMILY MEDICINE

## 2020-09-10 PROCEDURE — 3008F BODY MASS INDEX DOCD: CPT | Performed by: FAMILY MEDICINE

## 2020-09-10 PROCEDURE — 3078F DIAST BP <80 MM HG: CPT | Performed by: FAMILY MEDICINE

## 2020-09-10 PROCEDURE — 90471 IMMUNIZATION ADMIN: CPT | Performed by: FAMILY MEDICINE

## 2020-09-10 NOTE — PROGRESS NOTES
HPI:    Patient ID: Henryjimmie Lopes is a 63year old female. Vision Problem   This is a chronic problem. The current episode started more than 1 month ago. The problem occurs daily. The problem has been gradually worsening.  Associated symptoms include heada has no cervical adenopathy. Neurological: She is alert and oriented to person, place, and time. Skin: Skin is warm and dry.    See below              ASSESSMENT/PLAN:   Loss of peripheral visual field, left  (primary encounter diagnosis)– patient with l

## 2020-09-11 NOTE — TELEPHONE ENCOUNTER
Hi Dr. Marlena Higgins referral was just entered yesterday 9/10/20. [facility] has a 5 day turn around time.     I will work this referral as per your request.    Thank you  Rhonda Wu

## 2020-09-14 ENCOUNTER — TELEPHONE (OUTPATIENT)
Dept: FAMILY MEDICINE CLINIC | Facility: CLINIC | Age: 65
End: 2020-09-14

## 2020-09-14 NOTE — TELEPHONE ENCOUNTER
Patient is asking if she can postpone her MRI  due to her insurance changing.  She is unsure if the exam was to be completed asap

## 2020-09-15 NOTE — TELEPHONE ENCOUNTER
Spoke with [place] and notified her that Dr. Risa Braxton highly recommends getting the MRI. Pt verbalized understanding.  No further action needed

## 2020-09-24 ENCOUNTER — HOSPITAL ENCOUNTER (OUTPATIENT)
Dept: MRI IMAGING | Facility: HOSPITAL | Age: 65
Discharge: HOME | End: 2020-09-24
Attending: FAMILY MEDICINE
Payer: COMMERCIAL

## 2020-09-24 DIAGNOSIS — H53.452 LOSS OF PERIPHERAL VISUAL FIELD, LEFT: ICD-10-CM

## 2020-09-24 PROCEDURE — 70553 MRI BRAIN STEM W/O & W/DYE: CPT | Performed by: FAMILY MEDICINE

## 2020-09-24 PROCEDURE — 70543 MRI ORBT/FAC/NCK W/O &W/DYE: CPT | Performed by: FAMILY MEDICINE

## 2020-09-24 PROCEDURE — A9575 INJ GADOTERATE MEGLUMI 0.1ML: HCPCS | Performed by: FAMILY MEDICINE

## 2020-09-28 ENCOUNTER — TELEPHONE (OUTPATIENT)
Dept: OPHTHALMOLOGY | Facility: CLINIC | Age: 65
End: 2020-09-28

## 2020-09-28 NOTE — TELEPHONE ENCOUNTER
Spoke with patient and let her know that I forwarded her question to Dr. Danny Albarran. Dr. Danny Albarran, please call patient.

## 2020-09-29 ENCOUNTER — TELEPHONE (OUTPATIENT)
Dept: FAMILY MEDICINE CLINIC | Facility: CLINIC | Age: 65
End: 2020-09-29

## 2020-09-29 NOTE — TELEPHONE ENCOUNTER
Please let patient know she needs to call Linda herself and find out who is neuro-ophthalmologist in network to evaluate optic nerve abnormality. Then let us know.   Let her know other patients have found their patient support helpful in the past.

## 2020-10-06 ENCOUNTER — TELEPHONE (OUTPATIENT)
Dept: INTERNAL MEDICINE CLINIC | Facility: CLINIC | Age: 65
End: 2020-10-06

## 2020-10-06 DIAGNOSIS — H53.40 VISUAL FIELD CUT: ICD-10-CM

## 2020-10-06 DIAGNOSIS — H47.092: Primary | ICD-10-CM

## 2020-10-06 NOTE — TELEPHONE ENCOUNTER
Pt called neural opthamologist and can not get in until dec and would like to know if she can wait that long?  And would like to know if she needs a referral?

## 2020-10-08 NOTE — TELEPHONE ENCOUNTER
Please let patient know I have completed referral.  I copied orbit MRI reading onto referral but I do recommend she take a disc with MRI images to December appointment along with a right written copy of Dr. Ruben Serrano office note and 9/5/20 ophthalmology tech note. Please assist in getting these.

## 2020-10-08 NOTE — TELEPHONE ENCOUNTER
Left message to call back. No MyChart. Will need opthalmologist information-see Dr Bin Barone note. no cyanosis/normal/no clubbing

## 2020-10-08 NOTE — TELEPHONE ENCOUNTER
Spoke to patient and told her that the referral will take 5-10 business days for an authorization. I also told her that  recommend that she obtain a disk for the MRI she had and any office noted pertaining to the visit so that she has that when he goes to her appointment. She stated that her insurance may change currently its pending.  I told her once it does change she will need to call us so that we can update her chart and if needed enter a new referral

## 2020-11-02 ENCOUNTER — TELEPHONE (OUTPATIENT)
Dept: FAMILY MEDICINE CLINIC | Facility: CLINIC | Age: 65
End: 2020-11-02

## 2020-11-02 NOTE — TELEPHONE ENCOUNTER
Patient stopped by our office because she couldn't get her Rx refilled @ [facility], they told her Dr. Murray Decker was not a registered PCP w/[employer] [employer].  Email from Kirk Or stated that pharmacies could over ride this rejection of [employer] by ty

## 2020-11-04 ENCOUNTER — TELEPHONE (OUTPATIENT)
Dept: OPHTHALMOLOGY | Facility: CLINIC | Age: 65
End: 2020-11-04

## 2020-11-04 NOTE — TELEPHONE ENCOUNTER
Pt called stating pt is being referred to a neuro ophthalmologist .  Appointment with dr. Sriram Kendrick is on 12-15-20. Should any information be sent or can pt .   Please call

## 2021-02-18 ENCOUNTER — TELEPHONIC VISIT (OUTPATIENT)
Dept: BEHAVIORAL HEALTH | Age: 66
End: 2021-02-18

## 2021-02-18 DIAGNOSIS — E66.9 OBESITY (BMI 30-39.9): ICD-10-CM

## 2021-02-18 DIAGNOSIS — F41.1 GENERALIZED ANXIETY DISORDER: ICD-10-CM

## 2021-02-18 DIAGNOSIS — F33.41 RECURRENT MAJOR DEPRESSIVE DISORDER, IN PARTIAL REMISSION (CMD): ICD-10-CM

## 2021-02-18 DIAGNOSIS — F31.63 BIPOLAR 1 DISORDER, MIXED, SEVERE (CMD): Primary | ICD-10-CM

## 2021-02-18 PROCEDURE — 99213 OFFICE O/P EST LOW 20 MIN: CPT | Performed by: PSYCHIATRY & NEUROLOGY

## 2021-02-18 RX ORDER — OXCARBAZEPINE 150 MG/1
150 TABLET, FILM COATED ORAL 2 TIMES DAILY
Qty: 180 TABLET | Refills: 1 | Status: SHIPPED | OUTPATIENT
Start: 2021-02-18 | End: 2021-09-08 | Stop reason: SDUPTHER

## 2021-02-18 RX ORDER — BUPROPION HYDROCHLORIDE 150 MG/1
150 TABLET ORAL DAILY
Qty: 90 TABLET | Refills: 1 | Status: SHIPPED | OUTPATIENT
Start: 2021-02-18 | End: 2021-08-16 | Stop reason: SDUPTHER

## 2021-04-09 ENCOUNTER — NURSE TRIAGE (OUTPATIENT)
Dept: FAMILY MEDICINE CLINIC | Facility: CLINIC | Age: 66
End: 2021-04-09

## 2021-04-09 NOTE — TELEPHONE ENCOUNTER
Patient has decreased kidney function, therefore ibuprofen, Aleve and other NSAIDs should be avoided on a regular basis as they can worsen kidney function. Tylenol is safe, can try Tylenol 8-hour arthritis 2 tablets every 8 hours as needed.   Can also try

## 2021-04-09 NOTE — TELEPHONE ENCOUNTER
Reason for Disposition  • MODERATE back pain (e.g., interferes with normal activities) and present > 3 days    Protocols used: BACK PAIN-A-OH  Action Requested: Summary for Provider     []  Critical Lab, Recommendations Needed  [x] Need Additional Advice

## 2021-04-09 NOTE — TELEPHONE ENCOUNTER
Patient states she lifted a box and injured her lower back. Patient would like to know if she can take Naproxen, tylenol and ibuprofen are not helping as much. Please advise.

## 2021-04-11 ENCOUNTER — TELEPHONE (OUTPATIENT)
Dept: FAMILY MEDICINE CLINIC | Facility: CLINIC | Age: 66
End: 2021-04-11

## 2021-04-11 NOTE — TELEPHONE ENCOUNTER
On-call page–patient with persistent back pain. Concerned it may be her kidneys. No dysuria, urinary odor, frequency, chills or fever. Recommend immediate care evaluation tomorrow. ER tonight if worsening.

## 2021-06-18 RX ORDER — AMLODIPINE BESYLATE AND BENAZEPRIL HYDROCHLORIDE 5; 10 MG/1; MG/1
1 CAPSULE ORAL DAILY
Qty: 90 CAPSULE | Refills: 3 | Status: SHIPPED | OUTPATIENT
Start: 2021-06-18 | End: 2022-06-13

## 2021-06-18 RX ORDER — MONTELUKAST SODIUM 10 MG/1
10 TABLET ORAL NIGHTLY
Qty: 90 TABLET | Refills: 3 | Status: SHIPPED | OUTPATIENT
Start: 2021-06-18

## 2021-06-28 ENCOUNTER — NURSE TRIAGE (OUTPATIENT)
Dept: INTERNAL MEDICINE CLINIC | Facility: CLINIC | Age: 66
End: 2021-06-28

## 2021-06-28 NOTE — TELEPHONE ENCOUNTER
Patient calling to update on BP and HR. /90 and HR 98  Advised patient that vitals in normal range. Also advised to go to ER or UC with worsening symptoms before appointment tomorrow.   Future Appointments   Date Time Provider Timo Esteves   6/2

## 2021-06-28 NOTE — TELEPHONE ENCOUNTER
Action Requested: Summary for Provider     []  Critical Lab, Recommendations Needed  [] Need Additional Advice  []   FYI    []   Need Orders  [] Need Medications Sent to Pharmacy  []  Other     SUMMARY: Patient only wants to go to [address]

## 2021-06-29 ENCOUNTER — OFFICE VISIT (OUTPATIENT)
Dept: FAMILY MEDICINE CLINIC | Facility: CLINIC | Age: 66
End: 2021-06-29
Payer: MEDICAID

## 2021-06-29 VITALS
SYSTOLIC BLOOD PRESSURE: 137 MMHG | HEIGHT: 66.5 IN | TEMPERATURE: 98 F | HEART RATE: 111 BPM | WEIGHT: 179.81 LBS | DIASTOLIC BLOOD PRESSURE: 84 MMHG | BODY MASS INDEX: 28.56 KG/M2

## 2021-06-29 DIAGNOSIS — M26.629 TMJ SYNDROME: ICD-10-CM

## 2021-06-29 DIAGNOSIS — I87.2 VENOUS INSUFFICIENCY OF BOTH LOWER EXTREMITIES: ICD-10-CM

## 2021-06-29 DIAGNOSIS — H53.452 LOSS OF PERIPHERAL VISUAL FIELD, LEFT: ICD-10-CM

## 2021-06-29 DIAGNOSIS — N64.4 MASTODYNIA OF RIGHT BREAST: Primary | ICD-10-CM

## 2021-06-29 PROCEDURE — 3008F BODY MASS INDEX DOCD: CPT | Performed by: FAMILY MEDICINE

## 2021-06-29 PROCEDURE — 3079F DIAST BP 80-89 MM HG: CPT | Performed by: FAMILY MEDICINE

## 2021-06-29 PROCEDURE — 99214 OFFICE O/P EST MOD 30 MIN: CPT | Performed by: FAMILY MEDICINE

## 2021-06-29 PROCEDURE — 3075F SYST BP GE 130 - 139MM HG: CPT | Performed by: FAMILY MEDICINE

## 2021-06-29 NOTE — PROGRESS NOTES
HPI/Subjective:   Patient ID: Shlel Bryan is a 61year old female. Breast Pain  This is a new problem. The current episode started more than 1 month ago. The problem occurs intermittently. The problem has been waxing and waning.  Associated symptoms incl visual field, left–followed by ophthalmology. She has history of glaucoma and had MRI 1/2021 with some left optic nerve changes. Has follow-up with ophthalmology scheduled. Venous insufficiency of both lower extremities–with intermittent swelling.   Farrah Cotter

## 2021-08-16 ENCOUNTER — TELEPHONE (OUTPATIENT)
Dept: BEHAVIORAL HEALTH | Age: 66
End: 2021-08-16

## 2021-08-16 RX ORDER — BUPROPION HYDROCHLORIDE 150 MG/1
150 TABLET ORAL DAILY
Qty: 90 TABLET | Refills: 0 | Status: SHIPPED | OUTPATIENT
Start: 2021-08-16 | End: 2021-11-18

## 2021-09-07 ENCOUNTER — TELEPHONE (OUTPATIENT)
Dept: BEHAVIORAL HEALTH | Age: 66
End: 2021-09-07

## 2021-09-08 RX ORDER — OXCARBAZEPINE 150 MG/1
150 TABLET, FILM COATED ORAL 2 TIMES DAILY
Qty: 180 TABLET | Refills: 0 | Status: SHIPPED | OUTPATIENT
Start: 2021-09-08 | End: 2021-11-18

## 2021-09-23 ENCOUNTER — APPOINTMENT (OUTPATIENT)
Dept: BEHAVIORAL HEALTH | Age: 66
End: 2021-09-23

## 2021-09-27 ENCOUNTER — TELEPHONIC VISIT (OUTPATIENT)
Dept: BEHAVIORAL HEALTH | Age: 66
End: 2021-09-27

## 2021-09-27 DIAGNOSIS — F41.1 GENERALIZED ANXIETY DISORDER: ICD-10-CM

## 2021-09-27 DIAGNOSIS — F31.63 BIPOLAR 1 DISORDER, MIXED, SEVERE (CMD): Primary | ICD-10-CM

## 2021-09-27 DIAGNOSIS — E66.9 OBESITY (BMI 30-39.9): ICD-10-CM

## 2021-09-27 PROCEDURE — 99213 OFFICE O/P EST LOW 20 MIN: CPT | Performed by: PSYCHIATRY & NEUROLOGY

## 2021-09-29 ENCOUNTER — TELEPHONE (OUTPATIENT)
Dept: FAMILY MEDICINE CLINIC | Facility: CLINIC | Age: 66
End: 2021-09-29

## 2021-10-29 NOTE — TELEPHONE ENCOUNTER
-  Patient is asking whom you are advising her to see. Patient stated she was advised a \"neuro-opthamologist\" by Shimon Sahu after her MRI was competed 9/24/20.      Please advise-  Thank you Received call from Lorie Aguila at [facility] with Red Flag Complaint. Brief description of triage: See below    Per mother, patient with \"itchy throat, cough, and dizziness\". Limited triage due to child not being present during call. Triage indicates for patient to see PCP in the office within 3 days. Advised [facility] if no available appts. Care advice provided, patient verbalizes understanding; denies any other questions or concerns; instructed to call back for any new or worsening symptoms. Writer provided warm transfer to [facility] at [facility] for appointment scheduling. Attention Provider: Thank you for allowing me to participate in the care of your patient. The patient was connected to triage in response to information provided to the ECC/PSC. Please do not respond through this encounter as the response is not directed to a shared pool. Reason for Disposition  Romana Wilcox thinks child needs to be seen for non-urgent acute problem    Answer Assessment - Initial Assessment Questions  1. DESCRIPTION: \"Describe your child's dizziness. \"      \"He said he feels dizzy. \"  - per mother. Mother also reports that the patient didn't know where he was when he woke up this morning, but that he was alert and oriented before leaving for school. 2. SEVERITY: \"How bad is it? \" \"Can your child stand and walk? \"      - MILD: walking normally      - MODERATE: interferes with normal activities (school, play)      - SEVERE: unable to walk, requires support to walk, feels like will pass out if tries to stand      Mild    3. ONSET:  \"When did the dizziness begin? \"      Yesterday    4. CAUSE: \"What do you think is causing the dizziness? \"      Unsure    5. RECURRENT SYMPTOM: \"Has your child had dizziness before? \" If so, ask: \"When was the last time? \" \"What happened that time? \"      Denies    6. CHILD'S APPEARANCE: \"How sick is your child acting? \" \" What is he doing right now? \" If asleep, ask: \"How was he acting before he went to sleep? \"      \"He was fine this morning and he acted fine, other than the cough. \"    Protocols used: [id number]-AA

## 2021-11-18 ENCOUNTER — TELEPHONE (OUTPATIENT)
Dept: BEHAVIORAL HEALTH | Age: 66
End: 2021-11-18

## 2021-11-18 RX ORDER — BUPROPION HYDROCHLORIDE 150 MG/1
TABLET ORAL
Qty: 90 TABLET | Refills: 0 | Status: SHIPPED | OUTPATIENT
Start: 2021-11-18 | End: 2022-02-01 | Stop reason: SDUPTHER

## 2021-11-18 RX ORDER — OXCARBAZEPINE 150 MG/1
TABLET, FILM COATED ORAL
Qty: 180 TABLET | Refills: 0 | Status: SHIPPED | OUTPATIENT
Start: 2021-11-18 | End: 2022-03-01 | Stop reason: SDUPTHER

## 2022-01-03 ENCOUNTER — HOSPITAL ENCOUNTER (EMERGENCY)
Facility: HOSPITAL | Age: 67
Discharge: HOME | End: 2022-01-03
Payer: MEDICAID

## 2022-01-03 ENCOUNTER — APPOINTMENT (OUTPATIENT)
Dept: GENERAL RADIOLOGY | Facility: HOSPITAL | Age: 67
End: 2022-01-03
Payer: MEDICAID

## 2022-01-03 ENCOUNTER — APPOINTMENT (OUTPATIENT)
Dept: GENERAL RADIOLOGY | Facility: HOSPITAL | Age: 67
End: 2022-01-03
Attending: NURSE PRACTITIONER
Payer: MEDICAID

## 2022-01-03 VITALS
HEIGHT: 66.5 IN | WEIGHT: 178 LBS | TEMPERATURE: 98 F | BODY MASS INDEX: 28.27 KG/M2 | DIASTOLIC BLOOD PRESSURE: 107 MMHG | RESPIRATION RATE: 18 BRPM | HEART RATE: 90 BPM | OXYGEN SATURATION: 94 % | SYSTOLIC BLOOD PRESSURE: 150 MMHG

## 2022-01-03 DIAGNOSIS — S52.501A CLOSED FRACTURE OF DISTAL END OF RIGHT RADIUS, UNSPECIFIED FRACTURE MORPHOLOGY, INITIAL ENCOUNTER: Primary | ICD-10-CM

## 2022-01-03 PROCEDURE — 73130 X-RAY EXAM OF HAND: CPT | Performed by: NURSE PRACTITIONER

## 2022-01-03 PROCEDURE — 73090 X-RAY EXAM OF FOREARM: CPT | Performed by: NURSE PRACTITIONER

## 2022-01-03 PROCEDURE — 99284 EMERGENCY DEPT VISIT MOD MDM: CPT

## 2022-01-03 RX ORDER — HYDROCODONE BITARTRATE AND ACETAMINOPHEN 5; 325 MG/1; MG/1
1 TABLET ORAL EVERY 8 HOURS PRN
Qty: 10 TABLET | Refills: 0 | Status: SHIPPED | OUTPATIENT
Start: 2022-01-03 | End: 2022-01-06

## 2022-01-03 RX ORDER — HYDROCODONE BITARTRATE AND ACETAMINOPHEN 5; 325 MG/1; MG/1
1 TABLET ORAL ONCE
Status: COMPLETED | OUTPATIENT
Start: 2022-01-03 | End: 2022-01-03

## 2022-01-03 NOTE — ED INITIAL ASSESSMENT (HPI)
Patient to ER from home with c/o fall around 1500 today. Patient now c/o right wrist and right forearm pain. +cms distally. Sling placed in triage.

## 2022-01-04 ENCOUNTER — TELEPHONE (OUTPATIENT)
Dept: FAMILY MEDICINE CLINIC | Facility: CLINIC | Age: 67
End: 2022-01-04

## 2022-01-04 DIAGNOSIS — S52.609D CLOSED FRACTURE OF DISTAL END OF BOTH RADIUS AND ULNA WITH ROUTINE HEALING, UNSPECIFIED LATERALITY, SUBSEQUENT ENCOUNTER: Primary | ICD-10-CM

## 2022-01-04 DIAGNOSIS — S52.509D CLOSED FRACTURE OF DISTAL END OF BOTH RADIUS AND ULNA WITH ROUTINE HEALING, UNSPECIFIED LATERALITY, SUBSEQUENT ENCOUNTER: Primary | ICD-10-CM

## 2022-01-04 NOTE — TELEPHONE ENCOUNTER
message relayed to Pt, verbalized understanding stated feeling better since sling is on correctly, stated she will goto [facility] has appt 1/15/22

## 2022-01-04 NOTE — ED PROVIDER NOTES
Patient Seen in: [facility] AND [facility] Emergency Department    History   Patient presents with:  Fall    Stated Complaint: fell, arm injury    HPI    HPI: Emi Barreto is a 61year old female who presents after an injury to the R wrist  that occurred post mec stated in HPI.     Physical Exam     ED Triage Vitals [01/03/22 1704]   BP (!) 150/107   Pulse 90   Resp 18   Temp 97.8 °F (36.6 °C)   Temp src Temporal   SpO2 94 %   O2 Device None (Room air)       Current:BP (!) 150/107   Pulse 90   Temp 97.8 °F (36.6 °C) radius, unspecified fracture morphology, initial encounter  (primary encounter diagnosis)     Disposition:  Discharge  1/3/2022  7:09 pm    Follow-up:  Luis M Jacob, [address] [place] [address] Drive [id number] [phone]    Schedule an appointment

## 2022-01-04 NOTE — ED QUICK NOTES
Patient discharged home in no acute distress. A&Ox4, skin p/w/d, denies cp/sob. Ambulating with steady gait and verbalized understanding of d/c instructions and prescriptions. Post-mod to RUE in place with good cap refill/cms intact.

## 2022-01-04 NOTE — TELEPHONE ENCOUNTER
I agree BP is about expected with the pain. Agree with home checks when she is feeling better. The hydrocodone is maximal relief for oral medications. Remember that elevation and ice will also help.   Letter know I strongly recommend our orthopedic group

## 2022-01-04 NOTE — TELEPHONE ENCOUNTER
Pt calls and states that she fell on the ice yesterday and shattered her wrist. Was seen in [facility] ER.  BP was elevated in /107, so she was told to contact PCP. Pt denies chest pain, shortness of breath, dizziness and HA.  States that she was in a lot of

## 2022-01-05 ENCOUNTER — PATIENT MESSAGE (OUTPATIENT)
Dept: CASE MANAGEMENT | Age: 67
End: 2022-01-05

## 2022-01-05 NOTE — TELEPHONE ENCOUNTER
Spoke with patient ( verified)--does have ortho appt in [facility] tomorrow with ortho doctor, as [facility] does not accept Medicaid.  Patient does not know name of Josiah Butcher, but will call their scheduling number for appt information and r

## 2022-01-05 NOTE — TELEPHONE ENCOUNTER
Patient called back with name of ortho specialist at [facility]. Dr Marah Butts at [facility], fax number  [phone]    Patient states needs referral faxed ASAP has appointment tomorrow. Referral pended.

## 2022-01-05 NOTE — TELEPHONE ENCOUNTER
Pt calling to ask if referral was entered and pt advised per note below referral entered and authorized for 3 visits.   Pt verb understanding

## 2022-02-01 ENCOUNTER — TELEPHONE (OUTPATIENT)
Dept: BEHAVIORAL HEALTH | Age: 67
End: 2022-02-01

## 2022-02-01 RX ORDER — BUPROPION HYDROCHLORIDE 150 MG/1
150 TABLET ORAL DAILY
Qty: 90 TABLET | Refills: 0 | Status: SHIPPED | OUTPATIENT
Start: 2022-02-01 | End: 2022-04-25

## 2022-03-01 ENCOUNTER — TELEPHONE (OUTPATIENT)
Dept: BEHAVIORAL HEALTH | Age: 67
End: 2022-03-01

## 2022-03-01 RX ORDER — OXCARBAZEPINE 150 MG/1
150 TABLET, FILM COATED ORAL 2 TIMES DAILY
Qty: 180 TABLET | Refills: 0 | Status: SHIPPED | OUTPATIENT
Start: 2022-03-01 | End: 2022-04-25 | Stop reason: SDUPTHER

## 2022-03-21 ENCOUNTER — BEHAVIORAL HEALTH (OUTPATIENT)
Dept: BEHAVIORAL HEALTH | Age: 67
End: 2022-03-21

## 2022-03-21 DIAGNOSIS — F41.1 GENERALIZED ANXIETY DISORDER: ICD-10-CM

## 2022-03-21 DIAGNOSIS — F31.63 BIPOLAR 1 DISORDER, MIXED, SEVERE (CMD): Primary | ICD-10-CM

## 2022-03-21 DIAGNOSIS — E66.9 OBESITY (BMI 30-39.9): ICD-10-CM

## 2022-03-21 PROCEDURE — 99213 OFFICE O/P EST LOW 20 MIN: CPT | Performed by: PSYCHIATRY & NEUROLOGY

## 2022-04-25 ENCOUNTER — TELEPHONE (OUTPATIENT)
Dept: BEHAVIORAL HEALTH | Age: 67
End: 2022-04-25

## 2022-04-25 RX ORDER — BUPROPION HYDROCHLORIDE 150 MG/1
TABLET ORAL
Qty: 90 TABLET | Refills: 0 | Status: SHIPPED | OUTPATIENT
Start: 2022-04-25 | End: 2022-04-25 | Stop reason: SDUPTHER

## 2022-04-25 RX ORDER — BUPROPION HYDROCHLORIDE 150 MG/1
150 TABLET ORAL DAILY
Qty: 90 TABLET | Refills: 0 | Status: SHIPPED | OUTPATIENT
Start: 2022-04-25 | End: 2022-08-02 | Stop reason: SDUPTHER

## 2022-04-25 RX ORDER — OXCARBAZEPINE 150 MG/1
150 TABLET, FILM COATED ORAL 2 TIMES DAILY
Qty: 180 TABLET | Refills: 0 | Status: SHIPPED | OUTPATIENT
Start: 2022-04-25 | End: 2022-08-02 | Stop reason: SDUPTHER

## 2022-05-31 ENCOUNTER — NURSE TRIAGE (OUTPATIENT)
Dept: FAMILY MEDICINE CLINIC | Facility: CLINIC | Age: 67
End: 2022-05-31

## 2022-05-31 ENCOUNTER — OFFICE VISIT (OUTPATIENT)
Dept: FAMILY MEDICINE CLINIC | Facility: CLINIC | Age: 67
End: 2022-05-31
Payer: MEDICARE

## 2022-05-31 VITALS
TEMPERATURE: 97 F | HEIGHT: 66.5 IN | WEIGHT: 184.19 LBS | RESPIRATION RATE: 18 BRPM | SYSTOLIC BLOOD PRESSURE: 148 MMHG | DIASTOLIC BLOOD PRESSURE: 85 MMHG | BODY MASS INDEX: 29.25 KG/M2 | HEART RATE: 110 BPM

## 2022-05-31 DIAGNOSIS — S70.362A INSECT BITE OF LEFT THIGH, INITIAL ENCOUNTER: Primary | ICD-10-CM

## 2022-05-31 DIAGNOSIS — I10 ESSENTIAL HYPERTENSION: ICD-10-CM
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
## 2022-05-31 PROBLEM — H43.393 FLOATER, VITREOUS, BILATERAL: Status: RESOLVED | Noted: 2020-03-05 | Resolved: 2022-05-31

## 2022-05-31 PROBLEM — H04.123 BILATERAL DRY EYES: Status: RESOLVED | Noted: 2020-03-05 | Resolved: 2022-05-31

## 2022-05-31 PROBLEM — H53.453 PERIPHERAL VISION LOSS, BILATERAL: Status: ACTIVE | Noted: 2022-05-31

## 2022-05-31 PROBLEM — H26.493 AFTER CATARACT NOT OBSCURING VISION, BILATERAL: Status: RESOLVED | Noted: 2020-03-05 | Resolved: 2022-05-31

## 2022-05-31 PROCEDURE — 99213 OFFICE O/P EST LOW 20 MIN: CPT | Performed by: FAMILY MEDICINE

## 2022-05-31 RX ORDER — MONTELUKAST SODIUM 10 MG/1
10 TABLET ORAL NIGHTLY
Qty: 90 TABLET | Refills: 3 | Status: SHIPPED | OUTPATIENT
Start: 2022-05-31

## 2022-05-31 RX ORDER — AMLODIPINE BESYLATE AND BENAZEPRIL HYDROCHLORIDE 5; 10 MG/1; MG/1
1 CAPSULE ORAL DAILY
Qty: 90 CAPSULE | Refills: 3 | Status: SHIPPED | OUTPATIENT
Start: 2022-05-31 | End: 2023-05-26

## 2022-05-31 NOTE — TELEPHONE ENCOUNTER
Spoke with pt,  verified  Pt wants to f/u on earlier message, she will send picture via [employer]. Pt aware pending MD response. Pt stated understanding.

## 2022-05-31 NOTE — TELEPHONE ENCOUNTER
The patient was called and appointment was made for today 5/31/22 at 5:45 pm    Future Appointments   Date Time Provider Timo Esteves   5/31/2022  5:45 PM Estefany Christianson MD [address] [place] [address]

## 2022-06-03 ENCOUNTER — NURSE TRIAGE (OUTPATIENT)
Dept: FAMILY MEDICINE CLINIC | Facility: CLINIC | Age: 67
End: 2022-06-03

## 2022-06-03 NOTE — TELEPHONE ENCOUNTER
Please let her know I recommend increasing amlodipine/benazepril 5/10 to 2 tablets once a day every day. Continue to check blood pressure once a day always sitting for at least 5 minutes. Put readings in the phone or take a photo of the cuff and bring to upcoming visit.

## 2022-06-03 NOTE — TELEPHONE ENCOUNTER
Verified name and . Patient was seen in office with Dr. Marjorie Castro on 22 during which she states her elevated blood pressure readings were discussed. She states that Dr. Marjorie Castro advised her to monitor her blood pressures and to call if readings were still elevated. She reports that today, her readings have been 175/90 and 172/106 with pulse of 87. She denies any chest pain, difficulty breathing, blurred vision, headache at this time. She states that she normally takes her blood pressure medication at night- last dose was last night. Patient states that she has been under a lot of stress at home due to mentally ill  which has caused her blood pressures to be more elevated. Patient is requesting further recommendations from Dr. Marjorie Castro. Please advise and thank you. Patient was advised that if she starts to have any chest pain, difficulty breathing, blurred vision, unsteady gait, that she should go to emergency room or call 911 if she cannot drive herself.

## 2022-06-03 NOTE — TELEPHONE ENCOUNTER
Called patient, confirmed name and . Informed of advice below. Patient verbalized understanding and agrees.

## 2022-06-06 NOTE — TELEPHONE ENCOUNTER
Received call from Vicky (no WES) reports is patient's  and was calling with concerns regarding recent increase in patient's BP medication. Reports concerns that the previously reported BP levels are not valid, noted concerns that patient is a primary care giver and constantly being called, reports patient would get upset with checking her BP, she went to [place] today to have checked but she does have BP machine at home. Spouse reported several nonsensical statements about patient, reporting patient has neuro and mental health concerns, is concerned with patient developing other medical concerns with med changes, giving info about neighbors, kept reiterating that BP reading given previously is \"not valid\", that office told patient to go herself to get BP checked and was not done in office. Explained several times to let patient know just monitor BP and call back with readings, if help is needed with BP machine we can schedule a nurse visit. Spouse continued to report his concerns are not being understood and call was ended.

## 2022-06-07 ENCOUNTER — TELEPHONE (OUTPATIENT)
Dept: FAMILY MEDICINE CLINIC | Facility: CLINIC | Age: 67
End: 2022-06-07

## 2022-06-07 ENCOUNTER — NURSE ONLY (OUTPATIENT)
Dept: FAMILY MEDICINE CLINIC | Facility: CLINIC | Age: 67
End: 2022-06-07
Payer: MEDICARE

## 2022-06-07 VITALS — SYSTOLIC BLOOD PRESSURE: 134 MMHG | DIASTOLIC BLOOD PRESSURE: 80 MMHG

## 2022-06-07 DIAGNOSIS — I10 ESSENTIAL HYPERTENSION: Primary | ICD-10-CM

## 2022-06-07 RX ORDER — AMLODIPINE BESYLATE AND BENAZEPRIL HYDROCHLORIDE 10; 20 MG/1; MG/1
1 CAPSULE ORAL DAILY
Qty: 90 CAPSULE | Refills: 3 | Status: SHIPPED | OUTPATIENT
Start: 2022-06-07

## 2022-06-07 NOTE — TELEPHONE ENCOUNTER
Dr. Ingrid Mei,   see patients nurse visit blood pressure readings today. She braught her machine in to compare. 1. Her machine- 133/85  2. Manual- 134/80    Patient has been taking 2 pills daily of the amlodipine since Friday. Please advise if you would like patient to continue talking amlodipine 5 mg twice daily instead of once.   Thanks

## 2022-06-14 ENCOUNTER — OFFICE VISIT (OUTPATIENT)
Dept: FAMILY MEDICINE CLINIC | Facility: CLINIC | Age: 67
End: 2022-06-14
Payer: MEDICARE

## 2022-06-14 VITALS
WEIGHT: 182 LBS | TEMPERATURE: 97 F | BODY MASS INDEX: 28.9 KG/M2 | DIASTOLIC BLOOD PRESSURE: 82 MMHG | HEIGHT: 66.5 IN | RESPIRATION RATE: 18 BRPM | SYSTOLIC BLOOD PRESSURE: 129 MMHG | HEART RATE: 90 BPM

## 2022-06-14 DIAGNOSIS — I10 ESSENTIAL HYPERTENSION: Primary | ICD-10-CM

## 2022-06-14 DIAGNOSIS — Z12.31 ENCOUNTER FOR SCREENING MAMMOGRAM FOR MALIGNANT NEOPLASM OF BREAST: ICD-10-CM

## 2022-06-14 DIAGNOSIS — Z12.11 ENCOUNTER FOR SCREENING FOR MALIGNANT NEOPLASM OF COLON: ICD-10-CM

## 2022-06-14 DIAGNOSIS — F43.9 STRESS AT HOME: ICD-10-CM

## 2022-06-14 DIAGNOSIS — F32.A DEPRESSIVE DISORDER: ICD-10-CM

## 2022-06-14 PROCEDURE — 99213 OFFICE O/P EST LOW 20 MIN: CPT | Performed by: FAMILY MEDICINE

## 2022-06-16 ENCOUNTER — TELEPHONE (OUTPATIENT)
Dept: FAMILY MEDICINE CLINIC | Facility: CLINIC | Age: 67
End: 2022-06-16

## 2022-06-16 NOTE — TELEPHONE ENCOUNTER
Please let her know I think this is unlikely. Likely more due to warmer weather. Continue with support hose, apply Aquaphor ointment twice a day.

## 2022-06-16 NOTE — TELEPHONE ENCOUNTER
Spoke with pt,  verified  Pt stated last week her BP meds amlodipine benazepril 10-20mg was increased from 5-10 mg. Pt just wondering if increasing the dose can lead to burning and vein itchy on both legs. She has some dry skin. .   Pt stated last Tuesday her BP was good, she wears compression stocking. Walking is good. Pt denies redness, swelling, chest pain,shortness of breath, no other sx.   pls advise, thanks in advance.

## 2022-06-21 ENCOUNTER — APPOINTMENT (OUTPATIENT)
Dept: BEHAVIORAL HEALTH | Age: 67
End: 2022-06-21

## 2022-06-22 NOTE — TELEPHONE ENCOUNTER
Patient was instructed by her insurance to obtain prior authorization for the MRI that was ordered. Patient provided the phone number for the [employer] [phone]. Wellbutrin XL 300mg orally once daily  Escitalopram 30mg orally once daily  Suboxone 8mg/2mg orally once daily

## 2022-07-18 NOTE — PATIENT INSTRUCTIONS
Bilateral dry eyes  Schirmer's test today revealed dry eyes. Patient was instructed to use warm compresses to the eyelids twice a day everyday.     Instructions for warm compress use:   Patient should place wash compresses on both eyelids for 5 minutes e kind of tears, called lubricating tears, spread over the surface of your eyes. These tears keep the eyes moist and comfortable. You aren’t aware of these tears because they stay on the surface of your eyes. Without lubricating tears, your eyes get dry.  Sergei Humphrey you may want to buy lubricating eye drops made without preservatives. Your eye healthcare provider may also suggest using a lubricating eye ointment at night.   Medicine  Your eye healthcare provider may prescribe medicine such as cyclosporine to treat your no

## 2022-07-19 ENCOUNTER — NURSE TRIAGE (OUTPATIENT)
Dept: FAMILY MEDICINE CLINIC | Facility: CLINIC | Age: 67
End: 2022-07-19

## 2022-07-19 NOTE — TELEPHONE ENCOUNTER
Spoke with patient, (  verified ) informed of   instructions below    Future Appointments   Date Time Provider Timo Esteves   2022 11:45 AM MD HARVINDER Lezama Susie   2022 11:15 AM Bernice Garza MD [address] [place] [address]       Informed mask is required for building entry and appointment. Patient verbalizes understanding and agrees.

## 2022-07-26 ENCOUNTER — OFFICE VISIT (OUTPATIENT)
Dept: FAMILY MEDICINE CLINIC | Facility: CLINIC | Age: 67
End: 2022-07-26
Payer: MEDICARE

## 2022-07-26 VITALS
SYSTOLIC BLOOD PRESSURE: 128 MMHG | HEART RATE: 89 BPM | BODY MASS INDEX: 29 KG/M2 | DIASTOLIC BLOOD PRESSURE: 74 MMHG | TEMPERATURE: 98 F | WEIGHT: 183 LBS

## 2022-07-26 DIAGNOSIS — IMO0002: Primary | ICD-10-CM

## 2022-07-26 DIAGNOSIS — G89.29 CHRONIC RIGHT-SIDED LOW BACK PAIN WITH RIGHT-SIDED SCIATICA: ICD-10-CM

## 2022-07-26 DIAGNOSIS — M54.41 CHRONIC RIGHT-SIDED LOW BACK PAIN WITH RIGHT-SIDED SCIATICA: ICD-10-CM

## 2022-07-26 PROCEDURE — 99214 OFFICE O/P EST MOD 30 MIN: CPT | Performed by: FAMILY MEDICINE

## 2022-07-26 RX ORDER — MONTELUKAST SODIUM 10 MG/1
10 TABLET ORAL NIGHTLY
Qty: 90 TABLET | Refills: 3 | Status: SHIPPED | OUTPATIENT
Start: 2022-07-26

## 2022-08-02 ENCOUNTER — BEHAVIORAL HEALTH (OUTPATIENT)
Dept: BEHAVIORAL HEALTH | Age: 67
End: 2022-08-02

## 2022-08-02 DIAGNOSIS — F33.41 RECURRENT MAJOR DEPRESSIVE DISORDER, IN PARTIAL REMISSION (CMD): ICD-10-CM

## 2022-08-02 DIAGNOSIS — F41.1 GENERALIZED ANXIETY DISORDER: ICD-10-CM

## 2022-08-02 DIAGNOSIS — E66.9 OBESITY (BMI 30-39.9): ICD-10-CM

## 2022-08-02 DIAGNOSIS — F31.63 BIPOLAR 1 DISORDER, MIXED, SEVERE (CMD): Primary | ICD-10-CM

## 2022-08-02 PROCEDURE — 99442 TELEPHONE E&M BY PHYSICIAN EST PT NOT ORIG PREV 7 DAYS 11-20 MIN: CPT | Performed by: PSYCHIATRY & NEUROLOGY

## 2022-08-02 RX ORDER — BUPROPION HYDROCHLORIDE 150 MG/1
150 TABLET ORAL DAILY
Qty: 90 TABLET | Refills: 0 | Status: SHIPPED | OUTPATIENT
Start: 2022-08-02 | End: 2022-10-31 | Stop reason: SDUPTHER

## 2022-08-02 RX ORDER — OXCARBAZEPINE 150 MG/1
150 TABLET, FILM COATED ORAL 2 TIMES DAILY
Qty: 180 TABLET | Refills: 0 | Status: SHIPPED | OUTPATIENT
Start: 2022-08-02 | End: 2022-10-31 | Stop reason: SDUPTHER

## 2022-08-03 LAB
ATOPOBIUM VAGINAE: NOT DETECTED LOG (CELLS/ML)
C. ALBICANS, DNA: NOT DETECTED
C. GLABRATA, DNA: NOT DETECTED
C. PARAPSILOSIS, DNA: NOT DETECTED
C. TROPICALIS, DNA: NOT DETECTED
GARDNERELLA VAGINALIS: <4.7 LOG (CELLS/ML)
LACTOBACILLUS SPECIES: NOT DETECTED LOG (CELLS/ML)
MEGASPHAERA SPECIES: DETECTED LOG (CELLS/ML)
SURESWAB(R) TRICHOMONAS$VAGINALIS RNA, QL TMA: NOT DETECTED

## 2022-08-04 RX ORDER — METRONIDAZOLE 500 MG/1
500 TABLET ORAL 2 TIMES DAILY
Qty: 14 TABLET | Refills: 0 | Status: SHIPPED | OUTPATIENT
Start: 2022-08-04 | End: 2022-08-11

## 2022-08-05 ENCOUNTER — NURSE TRIAGE (OUTPATIENT)
Dept: FAMILY MEDICINE CLINIC | Facility: CLINIC | Age: 67
End: 2022-08-05

## 2022-08-05 DIAGNOSIS — N76.0 BV (BACTERIAL VAGINOSIS): Primary | ICD-10-CM

## 2022-08-05 DIAGNOSIS — B96.89 BV (BACTERIAL VAGINOSIS): Primary | ICD-10-CM

## 2022-08-05 RX ORDER — METRONIDAZOLE 7.5 MG/G
1 GEL VAGINAL 2 TIMES DAILY
Qty: 70 G | Refills: 0 | Status: SHIPPED | OUTPATIENT
Start: 2022-08-05 | End: 2022-08-10

## 2022-08-05 NOTE — TELEPHONE ENCOUNTER
MetroGel vaginal has been sent to the pharmacy. Please let the patient know that it is an intravaginal application. She can stop taking the oral metronidazole.

## 2022-08-05 NOTE — TELEPHONE ENCOUNTER
Patient stated that started the metronidazole for bacterial vaginosis, but it is not agreeing with her. States she is having diarrhea-loose stools, not watery and abdominal pain. Wanted to know if something else can be prescribed? Please advise. Dr Leonor Cruz out of the office till 8/14/2022.

## 2022-08-19 ENCOUNTER — NURSE TRIAGE (OUTPATIENT)
Dept: FAMILY MEDICINE CLINIC | Facility: CLINIC | Age: 67
End: 2022-08-19

## 2022-08-19 RX ORDER — FLUCONAZOLE 150 MG/1
150 TABLET ORAL ONCE
Qty: 1 TABLET | Refills: 0 | Status: SHIPPED | OUTPATIENT
Start: 2022-08-19 | End: 2022-08-19

## 2022-08-25 ENCOUNTER — OFFICE VISIT (OUTPATIENT)
Dept: FAMILY MEDICINE CLINIC | Facility: CLINIC | Age: 67
End: 2022-08-25
Payer: MEDICARE

## 2022-08-25 VITALS
TEMPERATURE: 97 F | WEIGHT: 184 LBS | SYSTOLIC BLOOD PRESSURE: 130 MMHG | BODY MASS INDEX: 30.66 KG/M2 | OXYGEN SATURATION: 99 % | HEIGHT: 65 IN | HEART RATE: 87 BPM | DIASTOLIC BLOOD PRESSURE: 70 MMHG

## 2022-08-25 DIAGNOSIS — N95.1 VAGINAL DRYNESS, MENOPAUSAL: ICD-10-CM

## 2022-08-25 DIAGNOSIS — B96.89 BV (BACTERIAL VAGINOSIS): Primary | ICD-10-CM

## 2022-08-25 DIAGNOSIS — N76.0 BV (BACTERIAL VAGINOSIS): Primary | ICD-10-CM

## 2022-08-25 LAB
APPEARANCE: CLEAR
BILIRUBIN: NEGATIVE
GLUCOSE (URINE DIPSTICK): NEGATIVE MG/DL
KETONES (URINE DIPSTICK): NEGATIVE MG/DL
MULTISTIX LOT#: ABNORMAL NUMERIC
NITRITE, URINE: NEGATIVE
OCCULT BLOOD: NEGATIVE
PH, URINE: 5.5 (ref 4.5–8)
PROTEIN (URINE DIPSTICK): NEGATIVE MG/DL
SPECIFIC GRAVITY: 1.02 (ref 1–1.03)
URINE-COLOR: YELLOW
UROBILINOGEN,SEMI-QN: 0.2 MG/DL (ref 0–1.9)

## 2022-08-25 PROCEDURE — 81002 URINALYSIS NONAUTO W/O SCOPE: CPT | Performed by: FAMILY MEDICINE

## 2022-08-25 PROCEDURE — 99214 OFFICE O/P EST MOD 30 MIN: CPT | Performed by: FAMILY MEDICINE

## 2022-08-30 ENCOUNTER — TELEPHONE (OUTPATIENT)
Dept: FAMILY MEDICINE CLINIC | Facility: CLINIC | Age: 67
End: 2022-08-30

## 2022-08-30 NOTE — TELEPHONE ENCOUNTER
Patient calling about her test results on [email or web address]. Please see test results from 8/25/22 and advise.

## 2022-09-02 NOTE — TELEPHONE ENCOUNTER
Called [facility] account number: [de-identified]). Confirmed patient's name and . They are stating that the container that the specimen was sent in is the wrong container for the order that was entered. The specimen was sent in a orange-label optima swab also know as SureSwab and can no longer be tested due to collection date being more than 7 days ago. Called patient, confirmed name and . Assisted to schedule to obtain a new collection.

## 2022-09-06 ENCOUNTER — OFFICE VISIT (OUTPATIENT)
Dept: FAMILY MEDICINE CLINIC | Facility: CLINIC | Age: 67
End: 2022-09-06
Payer: MEDICARE

## 2022-09-06 VITALS
BODY MASS INDEX: 31 KG/M2 | DIASTOLIC BLOOD PRESSURE: 72 MMHG | WEIGHT: 184 LBS | SYSTOLIC BLOOD PRESSURE: 115 MMHG | HEART RATE: 92 BPM

## 2022-09-06 DIAGNOSIS — N76.0 ACUTE VAGINITIS: Primary | ICD-10-CM

## 2022-09-06 PROCEDURE — 99212 OFFICE O/P EST SF 10 MIN: CPT | Performed by: FAMILY MEDICINE

## 2022-09-08 LAB
GENITAL VAGINOSIS SCREEN: NEGATIVE
TRICHOMONAS SCREEN: NEGATIVE

## 2022-09-13 ENCOUNTER — OFFICE VISIT (OUTPATIENT)
Dept: FAMILY MEDICINE CLINIC | Facility: CLINIC | Age: 67
End: 2022-09-13
Payer: MEDICARE

## 2022-09-13 VITALS
HEIGHT: 66 IN | HEART RATE: 93 BPM | DIASTOLIC BLOOD PRESSURE: 76 MMHG | BODY MASS INDEX: 29.57 KG/M2 | WEIGHT: 184 LBS | SYSTOLIC BLOOD PRESSURE: 118 MMHG | TEMPERATURE: 98 F

## 2022-09-13 DIAGNOSIS — I10 ESSENTIAL HYPERTENSION: ICD-10-CM

## 2022-09-13 DIAGNOSIS — Z00.00 WELCOME TO MEDICARE PREVENTIVE VISIT: ICD-10-CM

## 2022-09-13 DIAGNOSIS — J30.89 ENVIRONMENTAL AND SEASONAL ALLERGIES: ICD-10-CM

## 2022-09-13 DIAGNOSIS — I87.2 VENOUS INSUFFICIENCY OF BOTH LOWER EXTREMITIES: ICD-10-CM

## 2022-09-13 DIAGNOSIS — Z00.00 ENCOUNTER FOR ANNUAL HEALTH EXAMINATION: ICD-10-CM

## 2022-09-13 DIAGNOSIS — H53.453 PERIPHERAL VISION LOSS, BILATERAL: ICD-10-CM

## 2022-09-13 DIAGNOSIS — F31.70 BIPOLAR DISORDER IN FULL REMISSION, MOST RECENT EPISODE UNSPECIFIED TYPE (HCC): ICD-10-CM

## 2022-09-13 DIAGNOSIS — Z00.00 ROUTINE PHYSICAL EXAMINATION: Primary | ICD-10-CM

## 2022-09-13 DIAGNOSIS — Z78.0 ASYMPTOMATIC MENOPAUSAL STATE: ICD-10-CM

## 2022-09-13 DIAGNOSIS — N18.30 STAGE 3 CHRONIC KIDNEY DISEASE, UNSPECIFIED WHETHER STAGE 3A OR 3B CKD (HCC): ICD-10-CM

## 2022-09-13 PROBLEM — H93.12 TINNITUS OF LEFT EAR: Status: RESOLVED | Noted: 2020-03-05 | Resolved: 2022-09-13

## 2022-09-13 PROCEDURE — 90677 PCV20 VACCINE IM: CPT | Performed by: FAMILY MEDICINE

## 2022-09-13 PROCEDURE — G0402 INITIAL PREVENTIVE EXAM: HCPCS | Performed by: FAMILY MEDICINE

## 2022-09-13 PROCEDURE — G0009 ADMIN PNEUMOCOCCAL VACCINE: HCPCS | Performed by: FAMILY MEDICINE

## 2022-09-13 PROCEDURE — G0403 EKG FOR INITIAL PREVENT EXAM: HCPCS | Performed by: FAMILY MEDICINE

## 2022-09-14 LAB
ABSOLUTE BASOPHILS: 28 CELLS/UL (ref 0–200)
ABSOLUTE EOSINOPHILS: 83 CELLS/UL (ref 15–500)
ABSOLUTE LYMPHOCYTES: 1711 CELLS/UL (ref 850–3900)
ABSOLUTE MONOCYTES: 649 CELLS/UL (ref 200–950)
ABSOLUTE NEUTROPHILS: 4430 CELLS/UL (ref 1500–7800)
BASOPHILS: 0.4 %
BUN/CREATININE RATIO: 20 (CALC) (ref 6–22)
BUN: 23 MG/DL (ref 7–25)
CALCIUM: 10 MG/DL (ref 8.6–10.4)
CARBON DIOXIDE: 27 MMOL/L (ref 20–32)
CHLORIDE: 103 MMOL/L (ref 98–110)
CHOL/HDLC RATIO: 3.5 (CALC)
CHOLESTEROL, TOTAL: 248 MG/DL
CREATININE: 1.16 MG/DL (ref 0.5–1.05)
EGFR: 52 ML/MIN/1.73M2
EOSINOPHILS: 1.2 %
GLUCOSE: 83 MG/DL (ref 65–99)
HDL CHOLESTEROL: 71 MG/DL
HEMATOCRIT: 42.8 % (ref 35–45)
HEMOGLOBIN: 14.5 G/DL (ref 11.7–15.5)
LDL-CHOLESTEROL: 152 MG/DL (CALC)
LYMPHOCYTES: 24.8 %
MCH: 30.7 PG (ref 27–33)
MCHC: 33.9 G/DL (ref 32–36)
MCV: 90.7 FL (ref 80–100)
MONOCYTES: 9.4 %
MPV: 9.9 FL (ref 7.5–12.5)
NEUTROPHILS: 64.2 %
NON-HDL CHOLESTEROL: 177 MG/DL (CALC)
PLATELET COUNT: 270 THOUSAND/UL (ref 140–400)
POTASSIUM: 4.5 MMOL/L (ref 3.5–5.3)
RDW: 12.5 % (ref 11–15)
RED BLOOD CELL COUNT: 4.72 MILLION/UL (ref 3.8–5.1)
SODIUM: 138 MMOL/L (ref 135–146)
TRIGLYCERIDES: 123 MG/DL
WHITE BLOOD CELL COUNT: 6.9 THOUSAND/UL (ref 3.8–10.8)

## 2022-09-19 ENCOUNTER — TELEPHONE (OUTPATIENT)
Dept: FAMILY MEDICINE CLINIC | Facility: CLINIC | Age: 67
End: 2022-09-19

## 2022-09-19 NOTE — TELEPHONE ENCOUNTER
Patient calling, states the fecal collection container was contaminated with urine, and asking if a new sample is needed. Spoke with [facility], states for best results to avoid urine contamination    Patient verbalizes understanding, states she will  new collection kit at [facility].   Expressed concern as she had hoped to provide Dr. Mimi Burns the results by tomorrow

## 2022-10-07 NOTE — PROGRESS NOTES
HPI:    Patient ID: Fredrick Vyas is a 60year old female. Hypertension   This is a chronic problem. The current episode started more than 1 year ago. The problem is unchanged. The problem is controlled.  Pertinent negatives include no chest pain or headac Nephrology  Patient: Karen Lu Date:10/7/2022   : 1945 Attending: Rafael Jackson MD   77 year old female        Chief complaint: bilateral foot swelling     Reason for Consult: \"ARF/CKD, h/o RA/SLE\"    HPI from initial consult:   This is a 77 year old female with a past medical history significant for RA with chronic steroid use, CKD [id number], HFrEF (EF of 25%), ischemic dilated cardiomyopathy s/p ICD placement (2022), CAD, embolic CVA (2022), NSTEMI (2021).    She presented to the ED on  for evaluation of bilateral foot swelling and was admitted the following day for acute on chronic heart failure exacerbation. LE arterial US showed no significant arterial inflow disease but >75% stenosis of the distal TP artery which was unable to be stented d/t calcifications/plaque. Diuresis via Lasix was given until euvolemic and discontinued on 10/4. Creatinine was notably rising during admission. She visited the cath lab for LE vascular evaluation on 10/6. She was then transferred to the ICU on 10/7 due to hypotension and systolic BP in the 70s.    Patient has underlying [id number] secondary to HTN, NSAID use, cardiomyopathy, and age-related nephrosclerosis. She is seen outpatient by nephrologist Michael Sears MD. Her blood pressures are well controlled with a systolic around 110-120. She denies a PMH of SLE after rheumatology work up, and Dr. Sears denies its contribution to her CKD. Baseline creatinine around 1.5-1.7.    Admits to dizziness and easy bruising. Denies fevers, chills, appetite and weight changes, fatigue, SOB, chest pain, abdominal pain, nausea/vomiting, weakness.    Interval History:  10/6: Patient visit to cath lab for vascular evaluation of LE. Creatinine 1.89.  10/7: ICU transfer. Patient alert and conversational, though sleepy. Milrinone discontinued. 1 unit PRBCs given since Hgb of 6.1. CVC placed. CT wo contrast to determine source of potential bleed. Creatinine 2.61.    Past  Medical History:   Diagnosis Date   • Anemia    • Arthritis     Rheaumatoid   • B-COMPLEX DEFIC NEC, low b12 07/01/2004   • Calculus of kidney Last ~1988    Kidney Stone x10   • Chronic pain     Bilateral knees, elbows, hips   • CKD (chronic kidney disease), stage III (CMS/HCC)    • Fracture     Right elbow   • Full dentures     upper/lower complete   • Headache(784.0) 11/2000    Headaches (chronic) w/occ'l migraine attack -    • History of bone density study 02/02/2015    bone density ap hip and ap lumbar spine decreased by a significant 10.5%   • HYPERLIPIDEMIA NEC/NOS., low hdl 09/24/2002    pt declines   • Palpitations 06/28/2014   • Pneumonia    • Rheumatoid arthritis(714.0)    • Systemic lupus erythematosus (CMS/HCC) 02/04/2005    GERI IgG Antibodies:  >8.0   • Unspecified essential hypertension    • Urinary tract infection    • Wears glasses    • Wheelchair bound 11/15/2021    per nursing home, requires assitance with transfers and tolieting when using walker       Past Surgical History:   Procedure Laterality Date   • Albumin level  06/23/2005    3.5   • Beta 2 microglobulin serum  01/26/2005    5.19 (nl range 0.70-1.80)   • Bone imaging whole body  07/27/2004    normal   • Cataract extraction, bilateral Bilateral 8/2019, 9/2019 8/2019, 9/2019   • Ch50 total complement activity  02/04/2005    less than 10 (nl range 30-75)   • Closed rx elbow dislocation  05/13/2007    Posterior fracture-dislocation of the right elbow. Nondisplaced radial neck fracture with a probable fracture.   • Complement c3  02/04/2005    47 (nl range    • Complement c4  02/04/2005    3 (nl range 10-40   • Ct chest w/contrast  08/28/2007    No evidence of bilateral axillary or mediastinal lymphadenopathy. Stable densities in the right middle lobe and fibrosis in the lingula. Areas of faint increased density scattered throughout the thoracic vertebral bodies including posterior elements.   • Ct pelvis  06/14/2004    CT  and seasonal allergies  (primary encounter diagnosis)–patient is ,  Elysia Paredes, daughter Julian Marti is  in [facility]. 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 Exercises regularly with walking.   Has family close by includ thorax and pelvis   • Cystourethroscopy w/rem calcul  ~1978    Basket    • Dexa bone density axial skeleton  03/23/2005    Normal   • Icd implant     • Incision eardrum,aspir  01/29/2003   • Joint replacement Right 2005    elbow   • Myeloperoxidase and protease 3 ab  02/04/2005     P-ANCA positive   • Ptca with stent  11/17/2021   • Removal gallbladder      Cholecystectomy   • Remove tonsils/adenoids,<15 y/o      T & A   • Tonsillectomy     • X-ray chest 2 vw  03/07/2005    Rt upper lobe moderate infiltrate, streaky atelectasis or infiltrate at left base, small pleural effusion   • X-ray chest 2 vw  03/23/2005    Improvement in right upper lobe and left lower lobe areas of pneumonia .   • Xray mammogram screening bilat  02/02/2015    negative, [facility]       Social History     Socioeconomic History   • Marital status: /Civil Union     Spouse name: Vincenzo   • Number of children: 2   • Years of education: 13   • Highest education level: Not on file   Occupational History   • Occupation: - retired 2010     Employer: [employer]     Comment: [employer]   Tobacco Use   • Smoking status: Former Smoker     Types: Cigarettes   • Smokeless tobacco: Never Used   Substance and Sexual Activity   • Alcohol use: Not Currently     Alcohol/week: 0.0 - 1.0 standard drinks     Comment: one drink monthly   • Drug use: No   • Sexual activity: Yes     Partners: Male   Other Topics Concern   •  Service No   • Blood Transfusions No   • Caffeine Concern Not Asked   • Occupational Exposure Not Asked   • Hobby Hazards Not Asked   • Sleep Concern Not Asked   • Stress Concern Not Asked   • Weight Concern Not Asked   • Special Diet Not Asked   • Back Care Not Asked   • Exercise No   • Bike Helmet Not Asked   • Seat Belt Yes   • Self-Exams Not Asked   Social History Narrative   • Not on file     Social Determinants of Health     Financial Resource Strain: Not on file   Food Insecurity: Not on file    Transportation Needs: Not on file   Physical Activity: Not on file   Stress: Not on file   Social Connections: Not on file   Intimate Partner Violence: Not At Risk   • Social Determinants: Intimate Partner Violence Past Fear: No   • Social Determinants: Intimate Partner Violence Current Fear: No       Family History   Problem Relation Age of Onset   • Cancer Father         bone cancer   • Genitourinary Father         RCC/Stones on fathers side of family   • Cancer Sister         breast   • Myocardial Infarction Sister 49   • Stroke Sister        ALLERGIES:   Allergen Reactions   • Levofloxacin RASH   • Sulfa Antibiotics RASH     swelling/hives   • Zithromax [Azithromycin Dihydrate] GI UPSET and HEADACHES         Review of Systems:  Pertinent ROS above. Reviewed other systems which were negative.    Vital Last Value 24 Hour Range   Temperature 96.2 °F (35.7 °C) Temp  Min: 96.2 °F (35.7 °C)  Max: 97.4 °F (36.3 °C)   Pulse (!) 57 Pulse  Min: 0  Max: 72   Respiratory (!) 23 Resp  Min: 14  Max: 31   Blood Pressure 92/46 BP  Min: 65/34  Max: 146/66   Art BP   No data recorded   Pulse Oximetry 100 % SpO2  Min: 74 %  Max: 100 %     Vital Today Admitted   Weight 58.7 kg (129 lb 6.6 oz) Weight: 60.4 kg (133 lb 2.5 oz)   Height N/A Height: 5' 4\" (162.6 cm)   BMI N/A BMI (Calculated): 22.86     Weight over the past 48 Hours:  Patient Vitals for the past 48 hrs:   Weight   10/06/22 0445 55.8 kg (123 lb)   10/07/22 1150 58.7 kg (129 lb 6.6 oz)        Hemodynamics:      Last Value 24 Hour Range   CVP   No data recorded   PAS/PAD   No data recorded   PCWP   No data recorded   CO   No data recorded   CI   No data recorded   SVR   No data recorded   SV02   No data recorded     Intake/Output:    Last Stool Occurrence: 1 (10/03/22 1653)    I/O this shift:  In: 40 [P.O.:40]  Out: 300 [Urine:300]    I/O last 3 completed shifts:  In: 340 [P.O.:340]  Out: 0       Intake/Output Summary (Last 24 hours) at 10/7/2022 1420  Last data filed at  10/7/2022 1400  Gross per 24 hour   Intake 40 ml   Output 300 ml   Net -260 ml       Medications/Infusions:  Medications Prior to Admission   Medication Sig Dispense Refill   • FLUoxetine (PROzac) 10 MG capsule GIVE 1 CAPSULE BY MOUTH ONCE DAILY FOR DEPRESSION (Patient taking differently: Take 10 mg by mouth daily.) 30 capsule 0   • FLUoxetine (PROzac) 20 MG capsule Take with 10 mg of fluoxetine to = 30 mg per day 90 capsule 2   • clopidogrel (PLAVIX) 75 MG tablet GIVE 1 TABLET BY MOUTH ONCE DAILY FOR PREVENTATIVE (Patient taking differently: Take 75 mg by mouth daily.) 30 tablet 0   • AMIODarone (PACERONE) 200 MG tablet GIVE 1 TABLET BY MOUTH ONCE DAILY FOR HTN (Patient taking differently: Take 200 mg by mouth daily.) 30 tablet 0   • atorvastatin (LIPITOR) 80 MG tablet GIVE 1 TABLET BY MOUTH ONCE DAILY FOR CHOLESTEROL (Patient taking differently: Take 80 mg by mouth daily.) 30 tablet 0   • Eliquis 2.5 MG Tab GIVE 1 TABLET BY MOUTH EVERY 12 HOURS (Patient taking differently: Take 2.5 mg by mouth 2 (two) times a day.) 60 tablet 0   • folic acid (FOLATE) 1 MG tablet GIVE 1 TABLET BY MOUTH ONCE DAILY (Patient taking differently: Take 1 mg by mouth daily.) 30 tablet 0   • hydroxychloroquine (PLAQUENIL) 200 MG tablet GIVE 1 TABLET BY MOUTH TWICE DAILY FOR LUPUS. (Patient taking differently: Take 200 mg by mouth in the morning and 200 mg in the evening.) 60 tablet 0   • melatonin 5 MG GIVE 1 TABLET BY MOUTH AT BEDTIME FOR INSOMNIA (Patient taking differently: Take 5 mg by mouth nightly.) 30 tablet 0   • predniSONE (DELTASONE) 1 MG tablet GIVE 3 TABLETS (=3 MG) BY MOUTH ONCE DAILY (GIVE W/5 MG=8 MG) (Patient taking differently: Take 3 mg by mouth as directed. GIVE 3 TABLETS (=3 MG) BY MOUTH ONCE DAILY (GIVE W/5 MG=8 MG)) 90 tablet 0   • predniSONE (DELTASONE) 5 MG tablet GIVE 1 TABLET BY MOUTH ONCE DAILY (GIVE W/3 MG=8 MG) (Patient taking differently: Take 5 mg by mouth daily. Gives with 3MG=8MG) 30 tablet 0   • traZODone  (DESYREL) 50 MG tablet GIVE 1 TABLET BY MOUTH AT BEDTIME FOR DEPRESSION (Patient taking differently: Take 50 mg by mouth nightly.) 30 tablet 0   • pantoprazole (PROTONIX) 40 MG tablet GIVE 1 TABLET BY MOUTH EVERY MORNING 30 MINUTES BEFORE BREAKFAST R/T GERD (Patient taking differently: Take 40 mg by mouth daily.) 30 tablet 0   • metoPROLOL succinate (TOPROL-XL) 25 MG 24 hr tablet GIVE 1/2 TABLET (=12.5 MG) BY MOUTH ONCE DAILY (Patient taking differently: Take 12.5 mg by mouth daily.) 15 tablet 0   • midodrine (PROAMATINE) 5 MG tablet GIVE 1 TABLET BY MOUTH BEFORE MEALS (Patient taking differently: Take 5 mg by mouth 3 times daily (before meals).) 90 tablet 0   • Cholecalciferol (Vitamin D3) 50 mcg (2,000 units) tablet GIVE 1 TABLET BY MOUTH THREE TIMES DAILY (Patient taking differently: Take 50 mcg by mouth in the morning and 50 mcg at noon and 50 mcg in the evening.) 90 tablet 0   • ferrous gluconate (FERGON) 324 (38 Fe) MG tablet GIVE 1 TABLET BY MOUTH IN THE AFTERNOON FOR SUPPLEMENT (Patient taking differently: Take 324 mg by mouth daily.) 30 tablet 0   • losartan (COZAAR) 25 MG tablet GIVE 1/2 TABLET (=12.5 MG) BY MOUTH ONCE DAILY 15 tablet 0   • azaTHIOprine (IMURAN) 50 MG tablet GIVE 1 TABLET BY MOUTH ONCE DAILY FOR IMMUNOSUPPRESSIVE (Patient taking differently: Take 50 mg by mouth daily.) 30 tablet 0   • gabapentin (NEURONTIN) 100 MG capsule GIVE 1 CAPSULE BY MOUTH THREE TIMES DAILY FOR PAIN (Patient taking differently: Take 100 mg by mouth in the morning and 100 mg at noon and 100 mg in the evening.) 90 capsule 0   • ibandronate (BONIVA) 150 MG tablet Take 1 tablet by mouth every 30 days. Take on the first of every month 1 tablet 0   • acetaminophen (TYLENOL) 325 MG tablet GIVE 2 TABLETS (=650 MG) BY MOUTH EVERY 6 HOURS AS NEEDED FOR PAIN/TEMP (NTE 3 GM APAP/24 HRS) (Patient taking differently: Take 650 mg by mouth every 6 hours as needed for Pain.) 100 tablet 0   • mupirocin (BACTROBAN) 2 % ointment Apply  topically daily. The night before and the morning of the procedure 22 g 0   • furosemide (Lasix) 20 MG tablet Take 1 tablet by mouth daily. 30 tablet 3   • lidocaine (XYLOCAINE) 5 % ointment Apply topically to the buttock / tail bone, TID for pain. 35.44 each 5   • Acetaminophen Extra Strength 500 MG tablet GIVE 1 TABLET BY MOUTH EVERY 6 HOURS AS NEEDED FOR PAIN     (SEND WHEN RENEWAL RECD) 30 tablet 0   • traMADol (ULTRAM) 50 MG tablet Take 1 tablet by mouth in the morning and 1 tablet at noon and 1 tablet in the evening. (Patient taking differently: Take 50 mg by mouth in the morning and 50 mg in the evening.) 90 tablet 3   • bisacodyl (DULCOLAX) 10 MG suppository INSERT 1 SUPPOSITORY RECTALLY ONCE DAILY AS NEEDED FOR CONSTIPATION (Patient taking differently: Place 10 mg rectally daily as needed for Constipation.) 3 each 0   • polyethylene glycol (MIRALAX) 17 GM/SCOOP powder GIVE 17 GRAMS MIXED IN LIQUID BY MOUTH ONCE DAILY  AS NEEDED (Patient taking differently: Take 17 g by mouth as needed (Constipation).) 510 each 0   • Emollient (Moisturizing Lotion) Lotion Apply lotion to both legs and feet when removing compression socks at night. (Patient taking differently: as directed. Apply lotion to both legs and feet when removing compression socks at night.) 1 each 11   • cycloSPORINE (Restasis) 0.05 % ophthalmic emulsion Place 1 drop into right eye 2 times daily as needed (dry eye). 1 each 0   • propylene glycol (Systane Balance) 0.6 % Solution Place 1 drop into both eyes 2 times daily as needed (dry eye). 1 each 0     Current Facility-Administered Medications   Medication Dose Route Frequency Provider Last Rate Last Admin   • hydroCORTisone (Solu-CORTEF) PF injection 50 mg  50 mg Intravenous 3 times per day Hunter Gagnon MD       • aspirin tablet 325 mg  325 mg Oral Daily Kiesha Lee PA-C   325 mg at 10/06/22 1135   • AMIODarone (PACERONE) tablet 200 mg  200 mg Oral Daily Damian El MD   200 mg at  10/07/22 0958   • atorvastatin (LIPITOR) tablet 80 mg  80 mg Oral Daily Damian El MD   80 mg at 10/07/22 0959   • clopidogrel (PLAVIX) tablet 75 mg  75 mg Oral Daily Damian El MD   75 mg at 10/07/22 0957   • [Held by provider] apixaBAN (ELIQUIS) tablet 2.5 mg  2.5 mg Oral 2 times per day Damian El MD   2.5 mg at 10/04/22 0835   • folic acid (FOLATE) tablet 1 mg  1 mg Oral Daily Damian El MD   1 mg at 10/07/22 0957   • [Held by provider] metoPROLOL succinate (TOPROL-XL) ER tablet 12.5 mg  12.5 mg Oral Daily Damian El MD   12.5 mg at 10/05/22 0913   • pantoprazole (PROTONIX) EC tablet 40 mg  40 mg Oral QAM AC Damian El MD   40 mg at 10/07/22 0532   • azaTHIOprine (IMURAN) tablet 50 mg  50 mg Oral Daily Damian El MD   50 mg at 10/07/22 0956   • hydroxychloroquine (PLAQUENIL) tablet 200 mg  200 mg Oral BID WC Damian El MD   200 mg at 10/07/22 0957   • sodium chloride (PF) 0.9 % injection 2 mL  2 mL Intracatheter 2 times per day Damian El MD   2 mL at 10/06/22 2059   • Magnesium Standard Replacement Protocol   Does not apply See Admin Instructions Damian El MD       • Phosphorus Standard Replacement Protocol   Does not apply See Admin Instructions Damian El MD       • Potassium Standard Replacement Protocol (Levels 3.5 and lower)   Does not apply See Admin Instructions Damian El MD       • FLUoxetine (PROzac) capsule 30 mg  30 mg Oral Daily Hunter Gagnon MD   30 mg at 10/07/22 0957   • gabapentin (NEURONTIN) capsule 100 mg  100 mg Oral TID Hunter Gagnon MD   100 mg at 10/07/22 0958   • traMADol (ULTRAM) tablet 50 mg  50 mg Oral TID Hunter Gagnon MD   50 mg at 10/06/22 2022   • traZODone (DESYREL) tablet 50 mg  50 mg Oral Nightly Hunter Gagnon MD   50 mg at 10/06/22 2023   • thiamine (VITAMIN B1) tablet 100 mg  100 mg Oral Daily Hunter Gagnon MD   100 mg at 10/07/22 0957   • melatonin tablet 6  mg  6 mg Oral Nightly Hunter Gagnon MD   6 mg at 10/06/22 2022     Current Facility-Administered Medications   Medication Dose Route Frequency Provider Last Rate Last Admin   • sodium chloride 0.9% infusion   Intravenous Continuous PRN Hunter Gagnon MD           Physical Exam:    General: Alert, no acute distress, conversational though sleepy.  HEENT: Head atraumatic, normocephalic.  Moist oral mucus membranes.  Sclera non-icteric.   Neck: Supple, no JVD.  Trachea midline.  Chest/Lungs: Normal effort.  Symmetrical expansion.  Clear to auscultation though diminished breath sounds at lung bases.  No wheezes, rales or rhonchi.  CVS: Regular rate and rhythm. S1,S2 well heard. There is no S3 or S4 gallop. Has no pericardial rub heard.  Abdomen: BS well heard. Soft, non tender, non distended.  No hepatosplenomegaly.  Neuro: No focal neurological deficit.  A&O x 3.   Skin: Warm, dry.  With diffuse violaceous ecchymoses, most notable on hands, wrists, and dorsal surface of right foot (proximal to toes 2-4).  Extremities: No clubbing. Right foot with dusky coloring and cyanosis. Radial pulses 2+/3. LLE DP & TP pulses 2+/3. RLE DP & TP pulses not palpable but found with doppler. No edema.      Laboratory Results:  Recent Labs   Lab 10/07/22  0834 10/06/22  0439 10/05/22  0515 10/04/22  0740 10/03/22  1429 10/03/22  0429 10/02/22  0524 10/01/22  0334 09/30/22  2307   SODIUM 142 143 143 143  --  141 141   < > 144   POTASSIUM 3.7 3.5 3.9 3.7   < > 3.2* 3.9   < > 4.1   CHLORIDE 105 107 105 105  --  107 108   < > 108   CO2 30 27 32 34*  --  30 29   < > 31   ANIONGAP 11 13 10 8  --  7 8   < > 9   BUN 36* 27* 27* 23*  --  27* 23*   < > 26*   CREATININE 2.61* 1.89* 2.18* 1.99*  --  1.82* 1.66*   < > 1.76*  1.80*   GLUCOSE 108* 67* 94 89  --  87 95   < > 102*   CALCIUM 8.2* 9.0 9.1 9.5  --  8.7 8.7   < > 8.6   ALBUMIN  --   --   --  4.2  --   --  2.4*  --  2.7*   MG  --   --   --   --   --  2.1  --   --  2.3   AST  --   --   --    --   --   --  33  --  28   GPT  --   --   --   --   --   --  17  --  20   ALKPT  --   --   --   --   --   --  80  --  85   BILIRUBIN  --   --   --   --   --   --  0.7  --  0.4    < > = values in this interval not displayed.       Recent Labs   Lab 10/07/22  0834 10/06/22  1807 10/06/22  0439 10/05/22  0515   WBC 9.3  --  6.1 7.0   HGB 6.1* 7.0* 8.5* 9.2*   HCT 20.2* 22.7* 27.3* 29.6*   *  --  109* 122*       No results found    No results found    Urine Panel  No results found   Latest Reference Range & Units 08/08/22 13:43   COLOR  Yellow   CLARITY  Hazy   SPECIFIC GRAVITY 1.005 - 1.030  1.025   pH 5.0 - 7.0  6.0   GLUCOSE(URINE) Negative mg/dL Negative   KETONES Negative mg/dL Negative   PROTEIN(URINE) Negative mg/dL Trace !   BLOOD Negative  Large !   ERYTHROCYTES, URINALYSIS None Seen, 1 to 2 /hpf >100 !   LEUKOCYTES, URINALYSIS None Seen, 1 to 5 /hpf >100 !   LEUKOCYTE ESTERASE Negative  Large !   NITRITE Negative  Positive !   BILIRUBIN Negative  Negative   UROBILINOGEN 0.2, 1.0 mg/dL 0.2   BACTERIA, URINALYSIS None Seen /hpf Few !   HYALINE CASTS, URINALYSIS None Seen, 1 to 5 /lpf None Seen   Squamous EPI'S None Seen, 1 to 5 /hpf 1 to 5   MUCOUS  Present   !: Data is abnormal    Imaging/Procedures:    ICD Device Check 10/3/22:  Technical Findings: No device or lead performance issue(s) observed  Reason for Check: Bilateral feet Swelling  DEVICE ASSESSMENT:  Available daily battery/lead measurements within expected range. Lead trends stable.  ARRHYTHMIA SUMMARY:  No arrhythmias/high rate episodes detected since last interrogation on 01-Sep-2022  OBSERVATIONS:  Device appears to be currently functioning as programmed    Echo 10/1/22:  Final Impressions  LV enlargement with severe segmental LV systolic dysfunction. EF 27% Increased septal thickness. The basal to mid inferior, inferolateral,  anterior, lateral and basal infero-septal walls are akinetic. The rest of the walls are hypokinetic.  Moderately  decreased right ventricular radial systolic function with preserved longitudinal function. RV lead is seen.  Tethered MV leaflets.  Mild-moderate mitral valve regurgitation.  PASP 76 mmHg.    EKG 10/1/22:   Normal sinus rhythm   Left axis deviation   Left bundle branch block   Abnormal ECG   When compared with ECG of   30-SEP-2022 22:06,   No significant change was found   QTc of 494  Confirmed by ARABELLA DUMONT MD ([id number]) on 10/1/2022 2:28:32 AM   Also confirmed by ARABELLA DUMONT MD ([id number]),  Kenyatta Leigh ([id number])  on 10/1/2022 5:56:23 AM         CT ABDOMEN PELVIS WO CONTRAST   Final Result   IMPRESSION:    1.  The partially visualized proximal aspect of the left lower extremity   demonstrates increased density medially which is ill-defined and probably   represents intramuscular and subcutaneous hematoma.   2.  No evidence for retroperitoneal hematoma.   3.  Small, left larger than right pleural effusions.   4.  Distal colonic diverticulosis.   5.  Heterogeneous density of visualized bones, unchanged.   6.  13 mm left renal lesion likely a proteinaceous/hemorrhagic cyst.                     XR TUBE CHECK   Final Result   FINDINGS/IMPRESSION:      1.  Right IJ central venous catheter tip projects over the cavoatrial   junction. Left-sided single lead AICD is in similar position.   2.  The cardiomediastinal silhouette is unchanged. The pulmonary   vasculature is within normal limits for technique.   3.   To interfaces are identified projecting over the right lung suggesting   skin folds. No evident pneumothorax.   4.  Mild linear left perihilar and left lower lung opacities and mild   patchy right basilar opacities are likely atelectatic.   5.  Similar trace right pleural effusion.      Cath/PV Case   Final Result      US Lower Extremity Arteries Duplex Bilat   Final Result   IMPRESSION:      1.  No evidence of hemodynamically significant arterial inflow disease.   2.  Significant velocity acceleration the  left distal posterior tibial   artery suggesting greater than 75% stenosis.   3.  Patent 3-vessel runoff.      I, Attending Radiologist Christopher Marcelino MD, have reviewed the images and   report and concur with these findings interpreted by Resident Radiologist,   Franc Monzon MD.       IR PICC    (Results Pending)   Us Vasc Lower Extremity Pseudoaneurysm Duplex Left    (Results Pending)   XR CHEST AP OR PA    (Results Pending)         Impression, Plan & Recommendations:    · BEAR on CKD G3b A2  · Baseline creatinine ~1.5-1.7 with cardiorenal pattern  · Creatinine trending up since admission, urine protein of 74 in May 2022  · BEAR likely 2/2 a combination of prerenal and intrinsic causes: (1) reduced renal perfusion given stress on heart with acute on chronic heart failure contributing to a pump problem vs. (2) Hypotension causing vasoconstriction of renal vasculature when systolic BP was in the 70s vs. (3) cath lab contrast exposure on 10/6 contributing to DAYSI   · CKD is secondary to HTN, NSAID use, cardiomyopathy, and age-related nephrosclerosis.  · Avoid nephrotoxic meds like ACE/ARBs, NSAIDs, and contrast. If contrast deemed necessary, consider fluid bolus prior to study to aid in protection against DAYSI.  · Continue to prevent hypotensive episodes to prevent further insult to kidneys via vascular clamp down.  · Order UA to evaluate for hematuria, proteinuria (most recent from 5/2022)  · Consider ordering urine Na and urine Cr to calculate the FeNa and evaluate for ATN 2/2 contrast given diuretics stopped on the 10/4  · Re-evaluate kidney function tomorrow  · Acute on Chronic HFrEF  · Initially presented with bilateral foot swelling, original hypervolemia contributed to BEAR (see above)  · Echo with EF of 25-27%   · BNP of 46,053 at admission  · Received diuresis of Lasix until 10/4 given euvolemic status  · Milrinone discontinued 10/7  · Consider midodrine for BP  · Rheumatoid Arthritis  · Chronic NSAID use as  prior management contributed to her CKD  · Rheumatology work up with low suspicion of concurrent SLE  · Prednisone, hydroxychloroquine, and azathiaprine currently  · 10/7: steroid stress dose of 50 mg hydrocortisone TID      I would like to thank Dr. Gagnon for this consult.  My colleagues and I from [facility] will follow closely along with you. Please call if you have any questions.  I have performed the HPI and have discussed the above case including treatment plan with Dr. Carvalho.      Patient seen in collaboration with Dr. Carvalho.     Taylor Magill, PA-S  Pt with CKD 3b and now with ischemic and radiocontrast induced BEAR  Will monitor kidney function closely  Discussed with the pt , her  and the critical care RN

## 2022-10-07 NOTE — TELEPHONE ENCOUNTER
Noted, thank you. FAMILY HISTORY:  Family history of hypercholesterolemia    Sibling  Still living? Unknown  Family history of ulcerative colitis, Age at diagnosis: Age Unknown

## 2022-10-31 ENCOUNTER — TELEPHONE (OUTPATIENT)
Dept: BEHAVIORAL HEALTH | Age: 67
End: 2022-10-31

## 2022-10-31 RX ORDER — OXCARBAZEPINE 150 MG/1
150 TABLET, FILM COATED ORAL 2 TIMES DAILY
Qty: 180 TABLET | Refills: 0 | Status: SHIPPED | OUTPATIENT
Start: 2022-10-31 | End: 2022-12-27 | Stop reason: SDUPTHER

## 2022-10-31 RX ORDER — OXCARBAZEPINE 150 MG/1
150 TABLET, FILM COATED ORAL 2 TIMES DAILY
Qty: 180 TABLET | Refills: 0 | Status: SHIPPED | OUTPATIENT
Start: 2022-10-31 | End: 2022-10-31 | Stop reason: SDUPTHER

## 2022-10-31 RX ORDER — BUPROPION HYDROCHLORIDE 150 MG/1
150 TABLET ORAL DAILY
Qty: 90 TABLET | Refills: 0 | Status: SHIPPED | OUTPATIENT
Start: 2022-10-31 | End: 2022-10-31 | Stop reason: SDUPTHER

## 2022-10-31 RX ORDER — BUPROPION HYDROCHLORIDE 150 MG/1
150 TABLET ORAL DAILY
Qty: 90 TABLET | Refills: 0 | Status: SHIPPED | OUTPATIENT
Start: 2022-10-31 | End: 2022-12-27 | Stop reason: SDUPTHER

## 2022-11-01 NOTE — TELEPHONE ENCOUNTER
Yes, okay to wait till December.   Please give me the name and contact information so I can to referral. Awake/Alert

## 2022-11-13 LAB — AMB EXT COVID-19 RESULT: DETECTED

## 2022-11-16 ENCOUNTER — NURSE TRIAGE (OUTPATIENT)
Dept: FAMILY MEDICINE CLINIC | Facility: CLINIC | Age: 67
End: 2022-11-16

## 2022-12-27 ENCOUNTER — BEHAVIORAL HEALTH (OUTPATIENT)
Dept: BEHAVIORAL HEALTH | Age: 67
End: 2022-12-27

## 2022-12-27 DIAGNOSIS — F41.1 GENERALIZED ANXIETY DISORDER: ICD-10-CM

## 2022-12-27 DIAGNOSIS — E66.9 OBESITY (BMI 30-39.9): ICD-10-CM

## 2022-12-27 DIAGNOSIS — F33.41 RECURRENT MAJOR DEPRESSIVE DISORDER, IN PARTIAL REMISSION (CMD): ICD-10-CM

## 2022-12-27 DIAGNOSIS — F31.63 BIPOLAR 1 DISORDER, MIXED, SEVERE (CMD): Primary | ICD-10-CM

## 2022-12-27 PROCEDURE — 99442 TELEPHONE E&M BY PHYSICIAN EST PT NOT ORIG PREV 7 DAYS 11-20 MIN: CPT | Performed by: PSYCHIATRY & NEUROLOGY

## 2022-12-27 RX ORDER — BUPROPION HYDROCHLORIDE 150 MG/1
150 TABLET ORAL DAILY
Qty: 90 TABLET | Refills: 0 | Status: SHIPPED | OUTPATIENT
Start: 2022-12-27 | End: 2023-05-03 | Stop reason: SDUPTHER

## 2022-12-27 RX ORDER — OXCARBAZEPINE 150 MG/1
150 TABLET, FILM COATED ORAL 2 TIMES DAILY
Qty: 180 TABLET | Refills: 0 | Status: SHIPPED | OUTPATIENT
Start: 2022-12-27 | End: 2023-05-03 | Stop reason: SDUPTHER

## 2023-02-28 ENCOUNTER — TELEPHONE (OUTPATIENT)
Dept: FAMILY MEDICINE CLINIC | Facility: CLINIC | Age: 68
End: 2023-02-28

## 2023-02-28 NOTE — TELEPHONE ENCOUNTER
Patient asking if ok to get Shingrix vaccine, even if she doesn't remember having chicken pox. 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 Per CDC guidelines:  Chickenpox and shingles are related because they are caused by the same virus (varicella-zoster virus). After a person recovers from chickenpox, the virus stays dormant (inactive) in the body. It can reactivate years later and cause shingles. You can get Shingrix whether or not you remember having had chickenpox in the past.    Patient verbalized understanding. 6 month follow up appointment scheduled as well.     Future Appointments   Date Time Provider Timo Esteves   3/21/2023 11:30 AM Mary Kate Taylor  [address] [place] [address]

## 2023-03-21 ENCOUNTER — OFFICE VISIT (OUTPATIENT)
Dept: FAMILY MEDICINE CLINIC | Facility: CLINIC | Age: 68
End: 2023-03-21

## 2023-03-21 VITALS
DIASTOLIC BLOOD PRESSURE: 79 MMHG | BODY MASS INDEX: 30 KG/M2 | TEMPERATURE: 98 F | HEART RATE: 103 BPM | SYSTOLIC BLOOD PRESSURE: 127 MMHG | WEIGHT: 184.38 LBS

## 2023-03-21 DIAGNOSIS — N18.30 STAGE 3 CHRONIC KIDNEY DISEASE, UNSPECIFIED WHETHER STAGE 3A OR 3B CKD (HCC): ICD-10-CM

## 2023-03-21 DIAGNOSIS — F31.70 BIPOLAR DISORDER IN FULL REMISSION, MOST RECENT EPISODE UNSPECIFIED TYPE (HCC): ICD-10-CM

## 2023-03-21 DIAGNOSIS — I10 ESSENTIAL HYPERTENSION: Primary | ICD-10-CM

## 2023-03-21 DIAGNOSIS — M25.551 RIGHT HIP PAIN: ICD-10-CM

## 2023-03-21 PROCEDURE — 99213 OFFICE O/P EST LOW 20 MIN: CPT | Performed by: FAMILY MEDICINE

## 2023-05-03 RX ORDER — OXCARBAZEPINE 150 MG/1
150 TABLET, FILM COATED ORAL 2 TIMES DAILY
Qty: 180 TABLET | Refills: 0 | Status: SHIPPED | OUTPATIENT
Start: 2023-05-03 | End: 2023-07-20 | Stop reason: SDUPTHER

## 2023-05-03 RX ORDER — BUPROPION HYDROCHLORIDE 150 MG/1
150 TABLET ORAL DAILY
Qty: 90 TABLET | Refills: 0 | Status: SHIPPED | OUTPATIENT
Start: 2023-05-03 | End: 2023-07-20 | Stop reason: SDUPTHER

## 2023-05-11 ENCOUNTER — BEHAVIORAL HEALTH (OUTPATIENT)
Dept: BEHAVIORAL HEALTH | Age: 68
End: 2023-05-11

## 2023-05-11 DIAGNOSIS — E66.9 OBESITY (BMI 30-39.9): ICD-10-CM

## 2023-05-11 DIAGNOSIS — F41.1 GENERALIZED ANXIETY DISORDER: ICD-10-CM

## 2023-05-11 DIAGNOSIS — F33.41 RECURRENT MAJOR DEPRESSIVE DISORDER, IN PARTIAL REMISSION (CMD): ICD-10-CM

## 2023-05-11 DIAGNOSIS — F31.63 BIPOLAR 1 DISORDER, MIXED, SEVERE (CMD): Primary | ICD-10-CM

## 2023-05-11 PROCEDURE — 99442 TELEPHONE E&M BY PHYSICIAN EST PT NOT ORIG PREV 7 DAYS 11-20 MIN: CPT | Performed by: PSYCHIATRY & NEUROLOGY

## 2023-07-04 RX ORDER — AMLODIPINE BESYLATE AND BENAZEPRIL HYDROCHLORIDE 10; 20 MG/1; MG/1
1 CAPSULE ORAL DAILY
Qty: 90 CAPSULE | Refills: 3 | Status: SHIPPED | OUTPATIENT
Start: 2023-07-04

## 2023-07-04 NOTE — TELEPHONE ENCOUNTER
Please review; protocol failed/no protocol. Requested Prescriptions   Pending Prescriptions Disp Refills    amLODIPine Besy-Benazepril HCl 10-20 MG Oral Cap 90 capsule 3     Sig: Take 1 capsule by mouth daily. Hypertensive Medications Protocol Failed - 7/3/2023  1:31 PM        Failed - CMP or BMP in past 6 months     No results found for this or any previous visit (from the past 4392 hour(s)).             Passed - In person appointment in the past 12 or next 3 months     Recent Outpatient Visits              3 months ago Essential hypertension    [address] [facility], [facility] Edu Cheng MD    Office Visit    9 months ago Routine physical examination    [address] [facility], [facility] Edu Cheng MD    Office Visit    10 months ago Acute vaginitis    [address] [facility], [facility] Nitza Cloud MD    Office Visit    10 months ago BV (bacterial vaginosis)    [address], [address] [place] [address], [place] [address] Nitza Cloud MD    Office Visit    11 months ago Painful intercourse    [address], [address], [facility] Edu Cheng MD    Office Visit          Future Appointments         Provider Department Appt Notes    In 2 months Edu Cheng MD [address] [facility], [facility] annual [place] wellness exam               Passed - Last BP reading less than 140/90     BP Readings from Last 1 Encounters:  03/21/23 : 127/79              Passed - In person appointment or virtual visit in the past 6 months     Recent Outpatient Visits              3 months ago Essential hypertension    [address], [address], Shantel Braswell MD    Office Visit    9 months ago Routine physical examination    [address], [address], [facility] Edu Cheng MD    Office Visit    10 months ago Acute vaginitis    [address], [address] [place] [address] Sudhakar Maldonado MD    Office Visit    10 months ago BV (bacterial vaginosis)    [address] [facility], [facility] Betito Xavier MD    Office Visit    11 months ago Painful intercourse    [address], [address], [facility] Mitchell Guerin MD    Office Visit          Future Appointments         Provider Department Appt Notes    In 2 months Mitchell Guerin MD [address], [address], [facility] annual Medicare wellness exam               Passed - EGFRCR or GFRNAA > 50     GFR Evaluation                  Recent Outpatient Visits              3 months ago Essential hypertension    [address], [address], [facility] Mitchell Guerin MD    Office Visit    9 months ago Routine physical examination    [address], [address], [facility] Mitchell Guerin MD    Office Visit    10 months ago Acute vaginitis    [address], [address], [facility] Betito Xavier MD    Office Visit    10 months ago BV (bacterial vaginosis)    [address], [address], [facility] Betito Xavier MD    Office Visit    11 months ago Painful intercourse    [address], [address], [facility] Mitchell Guerin MD    Office Visit             Future Appointments         Provider Department Appt Notes    In 2 months Mitchell Guerin MD [address] [facility], [facility] annual [place] wellness exam

## 2023-07-20 RX ORDER — OXCARBAZEPINE 150 MG/1
150 TABLET, FILM COATED ORAL 2 TIMES DAILY
Qty: 180 TABLET | Refills: 0 | Status: SHIPPED | OUTPATIENT
Start: 2023-07-20 | End: 2023-10-25 | Stop reason: SDUPTHER

## 2023-07-20 RX ORDER — BUPROPION HYDROCHLORIDE 150 MG/1
150 TABLET ORAL DAILY
Qty: 90 TABLET | Refills: 0 | Status: SHIPPED | OUTPATIENT
Start: 2023-07-20 | End: 2023-10-25 | Stop reason: SDUPTHER

## 2023-08-07 ENCOUNTER — BEHAVIORAL HEALTH (OUTPATIENT)
Dept: BEHAVIORAL HEALTH | Age: 68
End: 2023-08-07

## 2023-08-07 DIAGNOSIS — F31.63 BIPOLAR 1 DISORDER, MIXED, SEVERE (CMD): Primary | ICD-10-CM

## 2023-08-07 DIAGNOSIS — F41.1 GENERALIZED ANXIETY DISORDER: ICD-10-CM

## 2023-08-07 DIAGNOSIS — E66.9 OBESITY (BMI 30-39.9): ICD-10-CM

## 2023-08-07 DIAGNOSIS — F33.41 RECURRENT MAJOR DEPRESSIVE DISORDER, IN PARTIAL REMISSION (CMD): ICD-10-CM

## 2023-08-07 PROCEDURE — 99213 OFFICE O/P EST LOW 20 MIN: CPT | Performed by: PSYCHIATRY & NEUROLOGY

## 2023-09-01 ENCOUNTER — NURSE TRIAGE (OUTPATIENT)
Dept: FAMILY MEDICINE CLINIC | Facility: CLINIC | Age: 68
End: 2023-09-01

## 2023-09-05 ENCOUNTER — OFFICE VISIT (OUTPATIENT)
Dept: FAMILY MEDICINE CLINIC | Facility: CLINIC | Age: 68
End: 2023-09-05

## 2023-09-05 VITALS
DIASTOLIC BLOOD PRESSURE: 72 MMHG | HEART RATE: 99 BPM | BODY MASS INDEX: 29 KG/M2 | TEMPERATURE: 98 F | SYSTOLIC BLOOD PRESSURE: 111 MMHG | WEIGHT: 182 LBS | OXYGEN SATURATION: 97 %

## 2023-09-05 DIAGNOSIS — K92.1 BLOOD IN STOOL: Primary | ICD-10-CM

## 2023-09-05 DIAGNOSIS — Z12.12 ENCOUNTER FOR COLORECTAL CANCER SCREENING: ICD-10-CM

## 2023-09-05 DIAGNOSIS — Z12.31 BREAST CANCER SCREENING BY MAMMOGRAM: ICD-10-CM

## 2023-09-05 DIAGNOSIS — Z12.11 ENCOUNTER FOR COLORECTAL CANCER SCREENING: ICD-10-CM

## 2023-09-05 PROCEDURE — 99213 OFFICE O/P EST LOW 20 MIN: CPT | Performed by: FAMILY MEDICINE

## 2023-09-05 NOTE — PROGRESS NOTES
Subjective:   Patient ID: Tg Colbert is a 79year old female. Patient has had small amount of bright red blood in stools after experiencing constipation. Also felt on buttocks 2 weeks ago, soreness after much improved. No dysuria, itching, vaginal discharge. Hemorrhoids        History/Other:   Review of Systems  Current Outpatient Medications   Medication Sig Dispense Refill    amLODIPine Besy-Benazepril HCl 10-20 MG Oral Cap Take 1 capsule by mouth daily. 90 capsule 3    montelukast 10 MG Oral Tab Take 1 tablet (10 mg total) by mouth nightly. 90 tablet 3    Fluticasone Propionate 50 MCG/ACT Nasal Suspension by Each Nare route daily. buPROPion HCl ER, XL, 150 MG Oral Tablet 24 Hr Take 1 tablet (150 mg total) by mouth daily. cetirizine 10 MG Oral Tab Take 1 tablet (10 mg total) by mouth daily. OXcarbazepine 150 MG Oral Tab Take 1 tablet (150 mg total) by mouth 2 (two) times daily. Allergies:  Trichophyton            OTHER (SEE COMMENTS)    Comment:environmental    Objective:   Physical Exam  Constitutional:       Appearance: Normal appearance. Genitourinary:     Comments: External genitalia normal  Vagina normal  Small external hemorrhoid at 1:00, no active bleeding. Rectal exam without masses, soft brown stool present. Neurological:      Mental Status: She is alert. Assessment & Plan:   Blood in stool  (primary encounter diagnosis)-consistent with hemorrhoids. However discussed importance of colon cancer screening. She feels unable to complete prep and schedule colonoscopy. Recommend gastroenterology consultation. In the meantime complete Cologuard. Encounter for colorectal cancer screening-order given    Breast cancer screening by mammogram-order given    No orders of the defined types were placed in this encounter.       Meds This Visit:  Requested Prescriptions      No prescriptions requested or ordered in this encounter       Imaging & Referrals:  EVALUATE & TREAT, GASTRO (INTERNAL)  COLOGUARD COLON CANCER SCREENING (EXTERNAL)  [facility] LETTY 2D+3D SCREENING BILAT (CPT=[id number])

## 2023-09-09 RX ORDER — MONTELUKAST SODIUM 10 MG/1
10 TABLET ORAL NIGHTLY
Qty: 90 TABLET | Refills: 3 | Status: SHIPPED | OUTPATIENT
Start: 2023-09-09

## 2023-09-09 NOTE — TELEPHONE ENCOUNTER
Refill passed per [employer] [employer], [facility] protocol.   Requested Prescriptions   Pending Prescriptions Disp Refills    MONTELUKAST 10 MG Oral Tab [Pharmacy Med Name: Montelukast Sodium 10 MG Oral Tablet] 90 tablet 0     Sig: TAKE 1 TABLET BY MOUTH ONCE DAILY AT NIGHT       Asthma & COPD Medication Protocol Passed - 9/8/2023 11:35 PM        Passed - In person appointment or virtual visit in the past 6 mos or appointment in next 3 mos     Recent Outpatient Visits              4 days ago Blood in stool    [address] [place] [address], [address] [place] [address] [address] MD    Office Visit    5 months ago Essential hypertension    [address], Jessica Ayoub MD    Office Visit    12 months ago Routine physical examination    [address], Jessica Ayoub MD    Office Visit    1 year ago Acute vaginitis    [address] [place] [address] kayden Ramirez MD    Office Visit    1 year ago BV (bacterial vaginosis)    [address], Jessica Ramirez MD    Office Visit          Future Appointments         Provider Department Appt Notes    In 1 week MD Kaylie Castro [address], [place] [facility] wellness exam                  Recent Outpatient Visits              4 days ago Blood in stool    [address], [address] [place], [place] [address], MD    Office Visit    5 months ago Essential hypertension    [address] Pro, [address] [place], [place] [address], MD    Office Visit    12 months ago Routine physical examination    Rose Meneses MD    Office Visit    1 year ago Acute vaginitis    Radha Fitzpatrick MD    Office Visit    1 year ago BV (bacterial vaginosis)    Kaylie Murguia, [address] [place] [address] Estevan Tejada MD    Office Visit          Future Appointments         Provider Department Appt Notes    In 1 week Brynn Perdue MD [address] [facility] annual [facility]

## 2023-09-19 ENCOUNTER — LAB ENCOUNTER (OUTPATIENT)
Dept: LAB | Age: 68
End: 2023-09-19
Attending: FAMILY MEDICINE
Payer: MEDICARE

## 2023-09-19 ENCOUNTER — OFFICE VISIT (OUTPATIENT)
Dept: FAMILY MEDICINE CLINIC | Facility: CLINIC | Age: 68
End: 2023-09-19
Payer: MEDICARE

## 2023-09-19 VITALS
DIASTOLIC BLOOD PRESSURE: 73 MMHG | WEIGHT: 186.13 LBS | SYSTOLIC BLOOD PRESSURE: 109 MMHG | BODY MASS INDEX: 29.91 KG/M2 | TEMPERATURE: 98 F | HEART RATE: 89 BPM | HEIGHT: 66 IN

## 2023-09-19 DIAGNOSIS — Z00.00 ENCOUNTER FOR ANNUAL HEALTH EXAMINATION: ICD-10-CM

## 2023-09-19 DIAGNOSIS — N18.31 STAGE 3A CHRONIC KIDNEY DISEASE (HCC): ICD-10-CM

## 2023-09-19 DIAGNOSIS — I10 ESSENTIAL HYPERTENSION: ICD-10-CM

## 2023-09-19 DIAGNOSIS — J30.89 ENVIRONMENTAL AND SEASONAL ALLERGIES: ICD-10-CM

## 2023-09-19 DIAGNOSIS — Z78.0 ASYMPTOMATIC MENOPAUSE: ICD-10-CM

## 2023-09-19 DIAGNOSIS — Z00.00 ENCOUNTER FOR ANNUAL PHYSICAL EXAM: Primary | ICD-10-CM

## 2023-09-19 DIAGNOSIS — F31.70 BIPOLAR DISORDER IN FULL REMISSION, MOST RECENT EPISODE UNSPECIFIED TYPE (HCC): ICD-10-CM

## 2023-09-19 DIAGNOSIS — H53.453 PERIPHERAL VISION LOSS, BILATERAL: ICD-10-CM

## 2023-09-19 DIAGNOSIS — I87.2 VENOUS INSUFFICIENCY OF BOTH LOWER EXTREMITIES: ICD-10-CM

## 2023-09-19 LAB
ANION GAP SERPL CALC-SCNC: 7 MMOL/L (ref 0–18)
BUN BLD-MCNC: 26 MG/DL (ref 7–18)
BUN/CREAT SERPL: 23.2 (ref 10–20)
CALCIUM BLD-MCNC: 9.3 MG/DL (ref 8.5–10.1)
CHLORIDE SERPL-SCNC: 106 MMOL/L (ref 98–112)
CHOLEST SERPL-MCNC: 237 MG/DL (ref ?–200)
CO2 SERPL-SCNC: 26 MMOL/L (ref 21–32)
CREAT BLD-MCNC: 1.12 MG/DL
DEPRECATED RDW RBC AUTO: 42.4 FL (ref 35.1–46.3)
EGFRCR SERPLBLD CKD-EPI 2021: 54 ML/MIN/1.73M2 (ref 60–?)
ERYTHROCYTE [DISTWIDTH] IN BLOOD BY AUTOMATED COUNT: 12.9 % (ref 11–15)
FASTING PATIENT LIPID ANSWER: NO
FASTING STATUS PATIENT QL REPORTED: NO
GLUCOSE BLD-MCNC: 81 MG/DL (ref 70–99)
HCT VFR BLD AUTO: 41.7 %
HDLC SERPL-MCNC: 70 MG/DL (ref 40–59)
HGB BLD-MCNC: 13.6 G/DL
LDLC SERPL CALC-MCNC: 143 MG/DL (ref ?–100)
MCH RBC QN AUTO: 29.5 PG (ref 26–34)
MCHC RBC AUTO-ENTMCNC: 32.6 G/DL (ref 31–37)
MCV RBC AUTO: 90.5 FL
NONHDLC SERPL-MCNC: 167 MG/DL (ref ?–130)
OSMOLALITY SERPL CALC.SUM OF ELEC: 292 MOSM/KG (ref 275–295)
PLATELET # BLD AUTO: 259 10(3)UL (ref 150–450)
POTASSIUM SERPL-SCNC: 4.5 MMOL/L (ref 3.5–5.1)
RBC # BLD AUTO: 4.61 X10(6)UL
SODIUM SERPL-SCNC: 139 MMOL/L (ref 136–145)
TRIGL SERPL-MCNC: 134 MG/DL (ref 30–149)
VLDLC SERPL CALC-MCNC: 25 MG/DL (ref 0–30)
WBC # BLD AUTO: 7 X10(3) UL (ref 4–11)

## 2023-09-19 PROCEDURE — 80061 LIPID PANEL: CPT

## 2023-09-19 PROCEDURE — G0438 PPPS, INITIAL VISIT: HCPCS | Performed by: FAMILY MEDICINE

## 2023-09-19 PROCEDURE — 80048 BASIC METABOLIC PNL TOTAL CA: CPT

## 2023-09-19 PROCEDURE — 1125F AMNT PAIN NOTED PAIN PRSNT: CPT | Performed by: FAMILY MEDICINE

## 2023-09-19 PROCEDURE — 85027 COMPLETE CBC AUTOMATED: CPT

## 2023-09-19 PROCEDURE — 36415 COLL VENOUS BLD VENIPUNCTURE: CPT

## 2023-10-25 RX ORDER — BUPROPION HYDROCHLORIDE 150 MG/1
150 TABLET ORAL DAILY
Qty: 90 TABLET | Refills: 0 | Status: SHIPPED | OUTPATIENT
Start: 2023-10-25

## 2023-10-25 RX ORDER — OXCARBAZEPINE 150 MG/1
150 TABLET, FILM COATED ORAL 2 TIMES DAILY
Qty: 180 TABLET | Refills: 0 | Status: SHIPPED | OUTPATIENT
Start: 2023-10-25

## 2023-10-28 ENCOUNTER — TELEPHONE (OUTPATIENT)
Dept: FAMILY MEDICINE CLINIC | Facility: CLINIC | Age: 68
End: 2023-10-28

## 2023-11-09 ENCOUNTER — BEHAVIORAL HEALTH (OUTPATIENT)
Dept: BEHAVIORAL HEALTH | Age: 68
End: 2023-11-09

## 2023-11-09 DIAGNOSIS — E66.9 OBESITY (BMI 30-39.9): ICD-10-CM

## 2023-11-09 DIAGNOSIS — F41.1 GENERALIZED ANXIETY DISORDER: ICD-10-CM

## 2023-11-09 DIAGNOSIS — F31.63 BIPOLAR 1 DISORDER, MIXED, SEVERE (CMD): Primary | ICD-10-CM

## 2023-11-09 DIAGNOSIS — F33.41 RECURRENT MAJOR DEPRESSIVE DISORDER, IN PARTIAL REMISSION (CMD): ICD-10-CM

## 2023-11-09 PROCEDURE — 99442 TELEPHONE E&M BY PHYSICIAN EST PT NOT ORIG PREV 7 DAYS 11-20 MIN: CPT | Performed by: PSYCHIATRY & NEUROLOGY

## 2023-12-04 ENCOUNTER — TELEPHONE (OUTPATIENT)
Dept: FAMILY MEDICINE CLINIC | Facility: CLINIC | Age: 68
End: 2023-12-04

## 2023-12-04 NOTE — TELEPHONE ENCOUNTER
Dr. Olga Lidia Camp - patient had mammogram at [address] [place] [address], please see care everywhere and advise     Patient called states she had a mammogram at [address] [place] [address]. She wanted to know results, I made her aware I can see the mammo from [address] [place] [address] and will make Dr. Olga Lidia Camp aware of results for her interpretation and any recommendations. Patient verbalized understanding. No further questions or concerns at this time.

## 2024-01-25 ENCOUNTER — TELEPHONE (OUTPATIENT)
Dept: BEHAVIORAL HEALTH | Age: 69
End: 2024-01-25

## 2024-01-25 RX ORDER — OXCARBAZEPINE 150 MG/1
150 TABLET, FILM COATED ORAL 2 TIMES DAILY
Qty: 180 TABLET | Refills: 0 | Status: SHIPPED | OUTPATIENT
Start: 2024-01-25

## 2024-01-25 RX ORDER — BUPROPION HYDROCHLORIDE 150 MG/1
150 TABLET ORAL DAILY
Qty: 90 TABLET | Refills: 0 | Status: SHIPPED | OUTPATIENT
Start: 2024-01-25

## 2024-02-08 ENCOUNTER — APPOINTMENT (OUTPATIENT)
Dept: BEHAVIORAL HEALTH | Age: 69
End: 2024-02-08

## 2024-02-08 DIAGNOSIS — F41.1 GENERALIZED ANXIETY DISORDER: ICD-10-CM

## 2024-02-08 DIAGNOSIS — E66.9 OBESITY (BMI 30-39.9): ICD-10-CM

## 2024-02-08 DIAGNOSIS — F31.63 BIPOLAR 1 DISORDER, MIXED, SEVERE (CMD): Primary | ICD-10-CM

## 2024-02-08 DIAGNOSIS — F33.41 RECURRENT MAJOR DEPRESSIVE DISORDER, IN PARTIAL REMISSION (CMD): ICD-10-CM

## 2024-02-27 ENCOUNTER — OFFICE VISIT (OUTPATIENT)
Dept: FAMILY MEDICINE CLINIC | Facility: CLINIC | Age: 69
End: 2024-02-27

## 2024-02-27 VITALS
WEIGHT: 191 LBS | DIASTOLIC BLOOD PRESSURE: 74 MMHG | OXYGEN SATURATION: 99 % | HEIGHT: 66 IN | HEART RATE: 96 BPM | SYSTOLIC BLOOD PRESSURE: 136 MMHG | BODY MASS INDEX: 30.7 KG/M2 | TEMPERATURE: 97 F

## 2024-02-27 DIAGNOSIS — L98.9 LESION OF SKIN OF SCALP: Primary | ICD-10-CM

## 2024-02-27 PROCEDURE — 99213 OFFICE O/P EST LOW 20 MIN: CPT

## 2024-02-27 NOTE — PROGRESS NOTES
Stephanie Cavanaugh is a 68 year old female.  Chief Complaint   Patient presents with    Derm Problem     Raised area to back of head ( scab looking) , painful around area on right side.     HPI:   Stephanie Cavanaugh presented to the clinic for lesion to the scalp x 2 weeks. Irritable. Tender to touch. Denies injury. No bleeding. Denies itching.  No known triggers. Wears hats when in the sun.     Current Outpatient Medications   Medication Sig Dispense Refill    montelukast 10 MG Oral Tab Take 1 tablet (10 mg total) by mouth nightly. 90 tablet 3    amLODIPine Besy-Benazepril HCl 10-20 MG Oral Cap Take 1 capsule by mouth daily. 90 capsule 3    Fluticasone Propionate 50 MCG/ACT Nasal Suspension by Each Nare route daily.      buPROPion HCl ER, XL, 150 MG Oral Tablet 24 Hr Take 1 tablet (150 mg total) by mouth daily.      cetirizine 10 MG Oral Tab Take 1 tablet (10 mg total) by mouth daily.      OXcarbazepine 150 MG Oral Tab Take 1 tablet (150 mg total) by mouth 2 (two) times daily.        Past Medical History:   Diagnosis Date    After cataract not obscuring vision, bilateral 3/5/2020    Amblyopia, left     Bilateral dry eyes 3/5/2020    Floater, vitreous, bilateral 3/5/2020    Wrist fracture, closed     right wrist fracture      Past Surgical History:   Procedure Laterality Date    CATARACT EXTRACTION W/  INTRAOCULAR LENS IMPLANT Bilateral 2015    [facility]    STRABISMUS SURGERY Bilateral 1960 and 1962    eye muscle surgery at age 5 and 7 for \"crossed eye\"       Social History:  Social History     Socioeconomic History    Marital status:    Tobacco Use    Smoking status: Never    Smokeless tobacco: Never   Vaping Use    Vaping Use: Never used   Substance and Sexual Activity    Alcohol use: Not Currently    Drug use: Not Currently      Family History   Problem Relation Age of Onset    Diabetes Neg     Glaucoma Neg     Macular degeneration Neg       Allergies   Allergen Reactions    Trichophyton OTHER (SEE COMMENTS)      environmental        REVIEW OF SYSTEMS:   Review of Systems   Constitutional:  Negative for activity change.   Respiratory:  Negative for chest tightness and shortness of breath.    Cardiovascular:  Negative for chest pain and palpitations.   Skin:  Positive for wound (scalp).   Neurological: Negative.    Psychiatric/Behavioral: Negative.     All other systems reviewed and are negative.     Wt Readings from Last 5 Encounters:   02/27/24 191 lb (86.6 kg)   09/19/23 186 lb 2 oz (84.4 kg)   09/05/23 182 lb (82.6 kg)   03/21/23 184 lb 6 oz (83.6 kg)   09/13/22 184 lb (83.5 kg)     Body mass index is 30.83 kg/m².      EXAM:   /74 (BP Location: Right arm, Patient Position: Sitting, Cuff Size: adult)   Pulse 96   Temp 96.8 °F (36 °C) (Temporal)   Ht 5' 6\" (1.676 m)   Wt 191 lb (86.6 kg)   SpO2 99%   BMI 30.83 kg/m²   Physical Exam  Vitals reviewed.   Constitutional:       Appearance: Normal appearance.   HENT:      Head: Normocephalic and atraumatic.   Pulmonary:      Effort: Pulmonary effort is normal.   Skin:            Comments: Rough, scaling patch to the scalp x 2. Tender to touch.    Neurological:      General: No focal deficit present.      Mental Status: She is alert and oriented to person, place, and time.   Psychiatric:         Mood and Affect: Mood normal.         Behavior: Behavior normal.            ASSESSMENT AND PLAN:   (L98.9) Lesion of skin of scalp  (primary encounter diagnosis)  Plan: patient with rough, scaling patch to the scalp x 2. HPI and exam consistent with AK? Cryotherapy applied. Advised to follow up in 4 weeks to monitor progress. If no progress will refer to dermatology for further assessment. Advised sun protection. Wear hats when outdoors. Sunscreen use.       Follow up in 4 weeks to monitor.       The patient indicates understanding of these issues and agrees to the plan.    This note was prepared using Dragon [employer] voice recognition dictation software. As a result errors may  occur. When identified these errors have been corrected. While every attempt is made to correct errors during dictation discrepancies may still exist.

## 2024-03-26 ENCOUNTER — OFFICE VISIT (OUTPATIENT)
Dept: FAMILY MEDICINE CLINIC | Facility: CLINIC | Age: 69
End: 2024-03-26
Payer: MEDICARE

## 2024-03-26 VITALS
BODY MASS INDEX: 31 KG/M2 | WEIGHT: 190 LBS | DIASTOLIC BLOOD PRESSURE: 73 MMHG | TEMPERATURE: 98 F | SYSTOLIC BLOOD PRESSURE: 120 MMHG | HEART RATE: 94 BPM

## 2024-03-26 DIAGNOSIS — H93.13 TINNITUS OF BOTH EARS: ICD-10-CM

## 2024-03-26 DIAGNOSIS — I10 ESSENTIAL HYPERTENSION: Primary | ICD-10-CM

## 2024-03-26 DIAGNOSIS — I87.2 VENOUS INSUFFICIENCY OF BOTH LOWER EXTREMITIES: ICD-10-CM

## 2024-03-26 PROCEDURE — 99213 OFFICE O/P EST LOW 20 MIN: CPT | Performed by: FAMILY MEDICINE

## 2024-03-26 NOTE — PROGRESS NOTES
Subjective:   Patient ID: Stephanie Cavanaugh is a 68 year old female.    Hypertension  This is a chronic problem. The current episode started more than 1 month ago. The problem is unchanged. The problem is controlled. Pertinent negatives include no chest pain or headaches. Risk factors for coronary artery disease include post-menopausal state and obesity. Past treatments include calcium channel blockers and ACE inhibitors.       History/Other:   Review of Systems   Cardiovascular:  Negative for chest pain.   Neurological:  Negative for headaches.     Current Outpatient Medications   Medication Sig Dispense Refill    montelukast 10 MG Oral Tab Take 1 tablet (10 mg total) by mouth nightly. 90 tablet 3    amLODIPine Besy-Benazepril HCl 10-20 MG Oral Cap Take 1 capsule by mouth daily. 90 capsule 3    Fluticasone Propionate 50 MCG/ACT Nasal Suspension by Each Nare route daily.      buPROPion HCl ER, XL, 150 MG Oral Tablet 24 Hr Take 1 tablet (150 mg total) by mouth daily.      cetirizine 10 MG Oral Tab Take 1 tablet (10 mg total) by mouth daily.      OXcarbazepine 150 MG Oral Tab Take 1 tablet (150 mg total) by mouth 2 (two) times daily.       Allergies:No Known Allergies    Objective:   Physical Exam  Constitutional:       Appearance: Normal appearance.   HENT:      Right Ear: Tympanic membrane normal.      Left Ear: Tympanic membrane normal.   Cardiovascular:      Rate and Rhythm: Normal rate and regular rhythm.      Pulses: Normal pulses.      Heart sounds: Normal heart sounds.   Pulmonary:      Effort: Pulmonary effort is normal.      Breath sounds: Normal breath sounds.   Musculoskeletal:      Right lower leg: Edema present.      Left lower leg: Edema present.   Neurological:      Mental Status: She is alert.         Assessment & Plan:   1. Essential hypertension-blood pressure well-controlled on current medication   2. Venous insufficiency of both lower extremities-1+ nonpitting ankle edema bilaterally.  Discussed support  hose, periodic elevation.   3. Tinnitus of both ears-small amount of right ear cerumen removed today.  TMs normal bilaterally.  She does have known TMJ.       No orders of the defined types were placed in this encounter.      Meds This Visit:  Requested Prescriptions      No prescriptions requested or ordered in this encounter       Imaging & Referrals:  None

## 2024-04-22 ENCOUNTER — TELEPHONE (OUTPATIENT)
Dept: BEHAVIORAL HEALTH | Age: 69
End: 2024-04-22

## 2024-04-22 RX ORDER — BUPROPION HYDROCHLORIDE 150 MG/1
150 TABLET ORAL DAILY
Qty: 90 TABLET | Refills: 0 | Status: SHIPPED | OUTPATIENT
Start: 2024-04-22

## 2024-04-22 RX ORDER — OXCARBAZEPINE 150 MG/1
150 TABLET, FILM COATED ORAL 2 TIMES DAILY
Qty: 180 TABLET | Refills: 0 | Status: SHIPPED | OUTPATIENT
Start: 2024-04-22

## 2024-06-17 RX ORDER — AMLODIPINE BESYLATE AND BENAZEPRIL HYDROCHLORIDE 10; 20 MG/1; MG/1
1 CAPSULE ORAL DAILY
Qty: 90 CAPSULE | Refills: 3 | Status: SHIPPED | OUTPATIENT
Start: 2024-06-17

## 2024-06-17 RX ORDER — MONTELUKAST SODIUM 10 MG/1
10 TABLET ORAL NIGHTLY
Qty: 90 TABLET | Refills: 3 | Status: SHIPPED | OUTPATIENT
Start: 2024-06-17

## 2024-06-17 NOTE — TELEPHONE ENCOUNTER
Refill passed per [facility] protocol.  Requested Prescriptions   Pending Prescriptions Disp Refills    amLODIPine Besy-Benazepril HCl 10-20 MG Oral Cap 90 capsule 3     Sig: Take 1 capsule by mouth daily.       Hypertension Medications Protocol Passed - 6/13/2024 10:53 AM        Passed - CMP or BMP in past 12 months        Passed - Last BP reading less than 140/90     BP Readings from Last 1 Encounters:   03/26/24 120/73               Passed - In person appointment or virtual visit in the past 12 mos or appointment in next 3 mos     Recent Outpatient Visits              2 months ago Essential hypertension    [facility] Brigitte Gardiner MD    Office Visit    3 months ago Lesion of skin of scalp    [facility] Thais House APRN    Office Visit    9 months ago Encounter for annual physical exam    [facility] Brigitte Gardiner MD    Office Visit    9 months ago Blood in stool    [facility] Brigitte Gardiner MD    Office Visit    1 year ago Essential hypertension    [facility] Brigitte Gardiner MD    Office Visit          Future Appointments         Provider Department Appt Notes    In 3 months Brigitte Gardiner MD [facility]                     Passed - EGFRCR or GFRNAA > 50     GFR Evaluation  EGFRCR: 54 , resulted on 9/19/2023            montelukast 10 MG Oral Tab 90 tablet 3     Sig: Take 1 tablet (10 mg total) by mouth nightly.       Asthma & COPD Medication Protocol Failed - 6/13/2024 10:53 AM        Failed - Asthma Action Score greater than or equal to 20        Failed - AAP/ACT given in last 12 months     No data recorded  No data recorded  No data recorded  No data recorded          Passed - Appointment in past 6 or next 3 months      Recent Outpatient Visits               2 months ago Essential hypertension    [facility], [facility] Brigitte Gardiner MD    Office Visit    3 months ago Lesion of skin of scalp    [facility], [facility] Thais House, L[place]    Office Visit    9 months ago Encounter for annual physical exam    [facility], [facility] Brigitte Gardiner MD    Office Visit    9 months ago Blood in stool    [facility], [facility] Brigitte Gardiner MD    Office Visit    1 year ago Essential hypertension    [facility], [facility] Brigitte Gardiner, MD    Office Visit          Future Appointments         Provider Department Appt Notes    In 3 months Brigitte Gardiner MD [facility], [facility]                        Recent Outpatient Visits              2 months ago Essential hypertension    [facility], [facility] Brigitte Gardiner MD    Office Visit    3 months ago Lesion of skin of scalp    [facility], [facility] Thais House, APRN    Office Visit    9 months ago Encounter for annual physical exam    [facility], [facility] Brigitte Gardiner MD    Office Visit    9 months ago Blood in stool    [facility], [facility] Brigitte Gardiner MD    Office Visit    1 year ago Essential hypertension    [facility], [facility] Brigitte Gardiner MD    Office Visit          Future Appointments         Provider Department Appt Notes    In 3 months Brigitte Gardiner MD [facility], [facility]

## 2024-06-17 NOTE — TELEPHONE ENCOUNTER
Please review; protocol failed/ has no protocol    Requested Prescriptions   Pending Prescriptions Disp Refills    montelukast 10 MG Oral Tab 90 tablet 3     Sig: Take 1 tablet (10 mg total) by mouth nightly.       Asthma & COPD Medication Protocol Failed - 6/13/2024 10:53 AM        Failed - Asthma Action Score greater than or equal to 20        Failed - AAP/ACT given in last 12 months     No data recorded  No data recorded  No data recorded  No data recorded          Passed - Appointment in past 6 or next 3 months      Recent Outpatient Visits              2 months ago Essential hypertension    [facility] Brigitte Gardiner MD    Office Visit    3 months ago Lesion of skin of scalp    [facility] Thais House APRN    Office Visit    9 months ago Encounter for annual physical exam    [facility] Brigitte Gardiner MD    Office Visit    9 months ago Blood in stool    [facility] Brigitte Gardiner MD    Office Visit    1 year ago Essential hypertension    [facility] Brigitte Gardiner MD    Office Visit          Future Appointments         Provider Department Appt Notes    In 3 months Brigitte Gardiner MD [facility]                      Signed Prescriptions Disp Refills    amLODIPine Besy-Benazepril HCl 10-20 MG Oral Cap 90 capsule 3     Sig: Take 1 capsule by mouth daily.       Hypertension Medications Protocol Passed - 6/13/2024 10:53 AM        Passed - CMP or BMP in past 12 months        Passed - Last BP reading less than 140/90     BP Readings from Last 1 Encounters:   03/26/24 120/73               Passed - In person appointment or virtual visit in the past 12 mos or appointment in next 3 mos     Recent Outpatient Visits              2 months ago Essential hypertension     [facility] Brigitte Gardiner MD    Office Visit    3 months ago Lesion of skin of scalp    [facility] Thais House APRN    Office Visit    9 months ago Encounter for annual physical exam    [facility] Brigitte Gardiner MD    Office Visit    9 months ago Blood in stool    [facility] Brigitte Gardiner MD    Office Visit    1 year ago Essential hypertension    [facility] Brigitte Gardiner MD    Office Visit          Future Appointments         Provider Department Appt Notes    In 3 months Brigitte Gardiner MD [facility]                     Passed - EGFRCR or GFRNAA > 50     GFR Evaluation  EGFRCR: 54 , resulted on 9/19/2023             Recent Outpatient Visits              2 months ago Essential hypertension    [facility] Brigitte Gardiner MD    Office Visit    3 months ago Lesion of skin of scalp    [facility] Thais House APRN    Office Visit    9 months ago Encounter for annual physical exam    [facility] Brigitte Gardiner MD    Office Visit    9 months ago Blood in stool    [facility] Brigitte Gardiner MD    Office Visit    1 year ago Essential hypertension    [facility] Brigitte Gardiner MD    Office Visit          Future Appointments         Provider Department Appt Notes    In 3 months Brigitte Gardiner MD [facility]

## 2024-06-24 ENCOUNTER — APPOINTMENT (OUTPATIENT)
Dept: BEHAVIORAL HEALTH | Age: 69
End: 2024-06-24

## 2024-06-24 DIAGNOSIS — E66.9 OBESITY (BMI 30-39.9): ICD-10-CM

## 2024-06-24 DIAGNOSIS — F41.1 GENERALIZED ANXIETY DISORDER: ICD-10-CM

## 2024-06-24 DIAGNOSIS — F33.41 RECURRENT MAJOR DEPRESSIVE DISORDER, IN PARTIAL REMISSION (CMD): ICD-10-CM

## 2024-06-24 DIAGNOSIS — F31.63 BIPOLAR 1 DISORDER, MIXED, SEVERE  (CMD): Primary | ICD-10-CM

## 2024-07-18 RX ORDER — BUPROPION HYDROCHLORIDE 150 MG/1
150 TABLET ORAL DAILY
Qty: 90 TABLET | Refills: 0 | Status: SHIPPED | OUTPATIENT
Start: 2024-07-18

## 2024-07-18 RX ORDER — OXCARBAZEPINE 150 MG/1
150 TABLET, FILM COATED ORAL 2 TIMES DAILY
Qty: 180 TABLET | Refills: 0 | Status: SHIPPED | OUTPATIENT
Start: 2024-07-18

## 2024-07-22 ENCOUNTER — NURSE TRIAGE (OUTPATIENT)
Dept: FAMILY MEDICINE CLINIC | Facility: CLINIC | Age: 69
End: 2024-07-22

## 2024-07-22 NOTE — TELEPHONE ENCOUNTER
Action Requested: Summary for Provider     []  Critical Lab, Recommendations Needed  [x] Need Additional Advice  []   FYI    [x]   Need Orders  [] Need Medications Sent to Pharmacy  []  Other     SUMMARY: Pulled right hamstring last week, pain better but now the side of her right knee is painful, pain level 5/10, hx multiple fall and injury on that side.Mild swelling, with bruise, occasional tingling and numbness right toes.    Informed that PCP and OPO appointment is fully booked until Friday (Dr Zavala is available on Friday ).    In the meantime, follow RICE (rest,ice=warm compress now after 48 hrs ,compression and elevate ), take over the counter pain medication like tylenol or ibuprofen.Avoid unnecessary activity .     Reason for call: Knee Pain  Onset: 6 days         Reason for Disposition   SEVERE pain (e.g., excruciating, unable to walk)    Protocols used: Knee Pain-A-OH

## 2024-07-26 ENCOUNTER — HOSPITAL ENCOUNTER (OUTPATIENT)
Dept: GENERAL RADIOLOGY | Age: 69
Discharge: HOME | End: 2024-07-26
Attending: FAMILY MEDICINE
Payer: MEDICARE

## 2024-07-26 ENCOUNTER — OFFICE VISIT (OUTPATIENT)
Dept: FAMILY MEDICINE CLINIC | Facility: CLINIC | Age: 69
End: 2024-07-26

## 2024-07-26 VITALS
WEIGHT: 186 LBS | RESPIRATION RATE: 17 BRPM | BODY MASS INDEX: 29.89 KG/M2 | SYSTOLIC BLOOD PRESSURE: 123 MMHG | HEART RATE: 89 BPM | HEIGHT: 66 IN | DIASTOLIC BLOOD PRESSURE: 78 MMHG | OXYGEN SATURATION: 98 %

## 2024-07-26 DIAGNOSIS — M25.561 ACUTE PAIN OF RIGHT KNEE: ICD-10-CM

## 2024-07-26 DIAGNOSIS — I83.90 VARICOSE VEINS: ICD-10-CM

## 2024-07-26 DIAGNOSIS — M25.561 ACUTE PAIN OF RIGHT KNEE: Primary | ICD-10-CM

## 2024-07-26 DIAGNOSIS — I10 ESSENTIAL HYPERTENSION: ICD-10-CM

## 2024-07-26 PROCEDURE — 99214 OFFICE O/P EST MOD 30 MIN: CPT | Performed by: FAMILY MEDICINE

## 2024-07-26 PROCEDURE — 73564 X-RAY EXAM KNEE 4 OR MORE: CPT | Performed by: FAMILY MEDICINE

## 2024-07-26 NOTE — PROGRESS NOTES
HPI:    Stephanie Cavanaugh is a 69 year old female presents to clinic with concerns regarding her right knee.  For the past week, maybe longer patient reports pain.  Denies known trauma or injury, but does report stepping the wrong direction about a week back.  Has since been trying to walk as frequently, recently purchased a brace which is helping.  Patient has poor peripheral vision, so has had many falls/injuries to her right knee.  She has been icing the knee daily which has helped.  Denies swelling.  Additionally, patient has history of varicose veins in both of her lower extremities.  She wears compression socks on most days.  Still reports pain/discomfort.  Interested in other treatment options.  HISTORY:  Past Medical History:    After cataract not obscuring vision, bilateral    Amblyopia, left    Bilateral dry eyes    Floater, vitreous, bilateral    Wrist fracture, closed    right wrist fracture      Past Surgical History:   Procedure Laterality Date    Cataract extraction w/  intraocular lens implant Bilateral 2015    [facility]    Strabismus surgery Bilateral 1960 and 1962    eye muscle surgery at age 5 and 7 for \"crossed eye\"       Family History   Problem Relation Age of Onset    Diabetes Neg     Glaucoma Neg     Macular degeneration Neg       Social History:   Social History     Socioeconomic History    Marital status:    Tobacco Use    Smoking status: Never    Smokeless tobacco: Never   Vaping Use    Vaping status: Never Used   Substance and Sexual Activity    Alcohol use: Not Currently    Drug use: Not Currently     Social Determinants of Health      Received [facility]    Social Connections    Received from [facility], [facility]    Housing Stability        Medications (Active prior to today's visit):  Current Outpatient Medications   Medication Sig Dispense Refill    amLODIPine Besy-Benazepril  HCl 10-20 MG Oral Cap Take 1 capsule by mouth daily. 90 capsule 3    montelukast 10 MG Oral Tab Take 1 tablet (10 mg total) by mouth nightly. 90 tablet 3    Fluticasone Propionate 50 MCG/ACT Nasal Suspension by Each Nare route daily.      buPROPion HCl ER, XL, 150 MG Oral Tablet 24 Hr Take 1 tablet (150 mg total) by mouth daily.      cetirizine 10 MG Oral Tab Take 1 tablet (10 mg total) by mouth daily.      OXcarbazepine 150 MG Oral Tab Take 1 tablet (150 mg total) by mouth 2 (two) times daily.         Allergies:  No Known Allergies      Depression Screening (PHQ-2/PHQ-9): Over the LAST 2 WEEKS                         ROS:   Review of Systems   All other systems reviewed and are negative.      PHYSICAL EXAM:     Vitals:    07/26/24 1140   BP: 123/78   BP Location: Left arm   Patient Position: Sitting   Cuff Size: large   Pulse: 89   Resp: 17   SpO2: 98%   Weight: 186 lb (84.4 kg)   Height: 5' 6\" (1.676 m)     Physical Exam  Vitals reviewed.   Constitutional:       General: She is not in acute distress.  Cardiovascular:      Rate and Rhythm: Normal rate.   Pulmonary:      Effort: Pulmonary effort is normal. No respiratory distress.   Musculoskeletal:      Comments: No swelling or point tenderness, no visible deformities, Ant/Post Drawer test - neg, Lachman Test - neg, ROM - WNL     Neurological:      Mental Status: She is alert.         ASSESSMENT/PLAN:   (M25.561) Acute pain of right knee  (primary encounter diagnosis)  Plan: XR KNEE, COMPLETE (4 OR MORE VIEWS), RIGHT         (CPT=73564)  -Unremarkable exam of right knee.  X-ray ordered.  Pending results, can consider physical therapy.  Patient has some transport issues, can consider home physical therapy or local options.  Will continue to follow    (I83.90) Varicose veins  Plan: Vascular Surgery - In Network  -Referred to vascular surgery for further management    (I10) Essential hypertension  Plan  -Blood pressure at goal, to continue current management.  New  lifestyle modifications encouraged.  She has follow-up visit with PCP in September.           Responsible party/patient verbalized understanding of information discussed. No barriers to learning observed.            Orders This Visit:  No orders of the defined types were placed in this encounter.      Meds This Visit:  Requested Prescriptions      No prescriptions requested or ordered in this encounter       Imaging & Referrals:  VASCULAR SURGERY - INTERNAL     Chaperone offered at visit today.     The 21st Century cures Act makes medical notes like these available to patients in the interest of transparency.  However, be advised that this is a medical document.  It is intended as peer to peer communication.  It is written in medical language and may contain abbreviations or verbiage that are unfamiliar.  It may appear blunt or direct.  Medical documents are intended to carry relevant information, facts as evident, and the clinical opinion of the practitioner.      This note was created by [employer] voice recognition. Errors in content may be related to improper recognition by the system; efforts to review and correct have been done but errors may still exist. Please contact me with any questions.       7/26/2024  Dano Zavala MD

## 2024-07-31 ENCOUNTER — OFFICE VISIT (OUTPATIENT)
Facility: CLINIC | Age: 69
End: 2024-07-31

## 2024-07-31 VITALS
SYSTOLIC BLOOD PRESSURE: 122 MMHG | BODY MASS INDEX: 29.89 KG/M2 | WEIGHT: 186 LBS | RESPIRATION RATE: 20 BRPM | DIASTOLIC BLOOD PRESSURE: 76 MMHG | HEIGHT: 66 IN

## 2024-07-31 DIAGNOSIS — R60.0 BILATERAL LOWER EXTREMITY EDEMA: Primary | ICD-10-CM

## 2024-07-31 NOTE — PROGRESS NOTES
VASCULAR SURGERY CONSULT NOTE      Stephanie Cavanaugh   :  1955  MR#  [id number]    REFERRING PHYSICIAN:  Dano Zavala  PRIMARY CARE PHYSICIAN:  Brigitte Gardiner MD    Chief Complaint   Patient presents with    Varicose Veins     Patient is c/o feet and legs VV or spider veins; bilateral leg edema, tightness, itchy x 10 years; worst ankles noticed x 1 year  Knee high and prefers socks compressions on and off x 10 years  No blood clots Hx  RT brace sleeve use for swelling and arthritis  No procedures or surgeries       HPI:    The patient is a 69 year old female who has been referred to the clinic today for an evaluation of her bilateral lower extremity edema. She reports this symptom has been present for over a year and worsens towards the end of the day. She also complains of tightness and pain in her calves and distal legs associated with the edema. She has worn knee high compression stockings in the recent past, but not consistently. Denies any cardiac disorders. Has not taken diuretics in the past to relief the symptoms. No sleep apnea or sleeping on a chair is reported. She has history of right knee osteoarthritis and chronic kidney disease.     PAST MEDICAL HISTORY:     Past Medical History:    After cataract not obscuring vision, bilateral    Amblyopia, left    Bilateral dry eyes    Floater, vitreous, bilateral    Wrist fracture, closed    right wrist fracture       PAST SURGICAL HISTORY:     Past Surgical History:   Procedure Laterality Date    Cataract extraction w/  intraocular lens implant Bilateral     [facility]    Strabismus surgery Bilateral  and     eye muscle surgery at age 5 and 7 for \"crossed eye\"         MEDICATIONS:     Current Outpatient Medications   Medication Sig Dispense Refill    amLODIPine Besy-Benazepril HCl 10-20 MG Oral Cap Take 1 capsule by mouth daily. 90 capsule 3    montelukast 10 MG Oral Tab Take 1 tablet (10 mg total) by mouth nightly. 90 tablet 3    Fluticasone  Propionate 50 MCG/ACT Nasal Suspension by Each Nare route daily.      buPROPion HCl ER, XL, 150 MG Oral Tablet 24 Hr Take 1 tablet (150 mg total) by mouth daily.      cetirizine 10 MG Oral Tab Take 1 tablet (10 mg total) by mouth daily.      OXcarbazepine 150 MG Oral Tab Take 1 tablet (150 mg total) by mouth 2 (two) times daily.         ALLERGIES:   No Known Allergies    SOCIAL HISTORY:     Social History     Socioeconomic History    Marital status:    Tobacco Use    Smoking status: Never    Smokeless tobacco: Never   Vaping Use    Vaping status: Never Used   Substance and Sexual Activity    Alcohol use: Not Currently    Drug use: Not Currently     Social Determinants of Health      Received from [facility], [facility]    Social Connections    Received from [facility], [facility]    Housing Stability        FAMILY HISTORY:     Family History   Problem Relation Age of Onset    Diabetes Neg     Glaucoma Neg     Macular degeneration Neg        ROS:   A 12 point review of systems with pertinent positives and negatives listed in the HPI.    PHYSICAL EXAM:   /76   Resp 20   Ht 5' 6\" (1.676 m)   Wt 186 lb (84.4 kg)   BMI 30.02 kg/m²   GENERAL: alert and orientated X 3, well developed, well nourished, in no apparent distress  HEENT: ears and throat are clear  NECK: supple, no lymphadenopathy, thyroid wnl  CAROTID: No bruits  RESPIRATORY: no rales, rhonchi, or wheezes B  CARDIO: RRR without murmur, no murmur, no gallop   ABDOMEN: soft, non-tender with no palpable aneurysm or masses  BACK: normal, no tenderness  SKIN: no rashes, warm and dry  NEURO/PSYCH: orientated x3, normal mood and affect, no sensory or motor deficit  EXTREMITIES: full range of motion, no tenderness or edema in either leg.   VASCULAR  Pulse exam right: femoral 2+, DP  2+, PT  2+  Pulse exam left: femoral  2+, DP  2+, PT  2+      Vein Assessment:      Mild to  moderate bilateral lower extremity edema.   Mild scattered bilateral  LE reticular veins with no significant hemosiderin deposition.    IMPRESSION:   Bilateral  lower extremity edema.    PLAN:     The patient was educated in the benign condition of venous disease.  I have given the patient a prescription for Grade II compression stockings (20-30 mmHg, knee-highs). We reviewed the importance of wearing these daily and consistently. We also reviewed other types of conservative measures, such as periodic leg elevation, walking for exercise, analgesic use, attempts at weight loss, and the avoidance of prolonged standing.  I have sent the patient for a venous reflux ultrasound to rule out venous insufficiency. Should the ultrasound study reveal venous reflux with dilation and she does not have relief of symptoms with conservative therapy, then endovenous laser ablation may be a possible treatment option.    I explained to the patient that a conservative approach would be best for her since there are multifactorial reasons to explain all of her symptoms including osteoarthritis, chronic kidney disease, and morbid obesity.   The patient understood and agreed to proceed with this treatment plan, all of her questions were answered during this clinic visit. She was asked to FU in several weeks to assess her response to conservative treatment.      Thank you for allowing to participate in the care of your patient.    JUAN FRANCISCO Verde  Division of Vascular Surgery with Dr. Najjar.

## 2024-08-01 ENCOUNTER — HOSPITAL ENCOUNTER (OUTPATIENT)
Dept: BONE DENSITY | Facility: HOSPITAL | Age: 69
Discharge: HOME | End: 2024-08-01
Attending: FAMILY MEDICINE
Payer: MEDICARE

## 2024-08-01 DIAGNOSIS — Z78.0 ASYMPTOMATIC MENOPAUSE: ICD-10-CM

## 2024-08-01 PROBLEM — M85.89 OSTEOPENIA OF MULTIPLE SITES: Status: ACTIVE | Noted: 2024-08-01

## 2024-08-01 PROCEDURE — 77080 DXA BONE DENSITY AXIAL: CPT | Performed by: FAMILY MEDICINE

## 2024-08-06 ENCOUNTER — MED REC SCAN ONLY (OUTPATIENT)
Dept: FAMILY MEDICINE CLINIC | Facility: CLINIC | Age: 69
End: 2024-08-06

## 2024-08-09 ENCOUNTER — TELEPHONE (OUTPATIENT)
Dept: FAMILY MEDICINE CLINIC | Facility: CLINIC | Age: 69
End: 2024-08-09

## 2024-08-09 NOTE — TELEPHONE ENCOUNTER
Pt stated she was seen 7/26/24 for knee pain, still in PT -twice a week, asking what can she use topical for the pain , hurts when she walks and at night that she have to use a cane to walk

## 2024-08-09 NOTE — TELEPHONE ENCOUNTER
Spoke to patient and relayed Dr. Zavala's message below. Patient verbalized understanding and had no further questions.

## 2024-08-14 ENCOUNTER — NURSE TRIAGE (OUTPATIENT)
Dept: FAMILY MEDICINE CLINIC | Facility: CLINIC | Age: 69
End: 2024-08-14

## 2024-08-14 NOTE — TELEPHONE ENCOUNTER
Reason for Disposition   Localized rash present > 7 days    Protocols used: Rash or Redness - [place]-[facility]-OD  Action Requested: Summary for Provider     []  Critical Lab, Recommendations Needed  [] Need Additional Advice  []   FYI    []   Need Orders  [] Need Medications Sent to Pharmacy  []  Other     SUMMARY: patient reports that she has had \"bumps\" that look like pimples on her right forearm for about 5 days.  Denies itching or pain at touch.  The bumps have not spread but appear to be flattening.  Patient would like to have this checked before going out of [facility].   Advised Immediate care as there are no appointments in [place] for several days. She declined Immediate care but I was able to find an appointment with Dr. Cordon.     Reason for call: Rash  Onset: Data Unavailable

## 2024-08-15 ENCOUNTER — OFFICE VISIT (OUTPATIENT)
Dept: FAMILY MEDICINE CLINIC | Facility: CLINIC | Age: 69
End: 2024-08-15

## 2024-08-15 VITALS
RESPIRATION RATE: 20 BRPM | TEMPERATURE: 98 F | SYSTOLIC BLOOD PRESSURE: 119 MMHG | HEART RATE: 102 BPM | WEIGHT: 185 LBS | BODY MASS INDEX: 29.73 KG/M2 | HEIGHT: 66 IN | DIASTOLIC BLOOD PRESSURE: 75 MMHG

## 2024-08-15 DIAGNOSIS — R21 RASH AND NONSPECIFIC SKIN ERUPTION: Primary | ICD-10-CM

## 2024-08-15 PROCEDURE — 99213 OFFICE O/P EST LOW 20 MIN: CPT | Performed by: FAMILY MEDICINE

## 2024-08-15 RX ORDER — CEPHALEXIN 500 MG/1
500 CAPSULE ORAL 2 TIMES DAILY
Qty: 14 CAPSULE | Refills: 0 | Status: SHIPPED | OUTPATIENT
Start: 2024-08-15

## 2024-08-15 NOTE — PROGRESS NOTES
Subjective:   Patient ID: Stephanie Cavanaugh is a 69 year old female.    Pt presents with a bump and rash on right upper arm and just noticed on chest and back of neck, . No new topical agents or ingestions. Pt has tried otc remedies- cortisone cream without consistent relief. No fevers or acute illness but is going on trip this weekend.   No insect bites or sig injury. Pt thinks might be abrasion on right arm and chest as she was lying on area on a yoga mat.          History/Other:   Review of Systems   Constitutional:  Negative for fever.   Skin:  Positive for rash.     Current Outpatient Medications   Medication Sig Dispense Refill    cephalexin (KEFLEX) 500 MG Oral Cap Take 1 capsule (500 mg total) by mouth 2 (two) times daily. 14 capsule 0    amLODIPine Besy-Benazepril HCl 10-20 MG Oral Cap Take 1 capsule by mouth daily. 90 capsule 3    montelukast 10 MG Oral Tab Take 1 tablet (10 mg total) by mouth nightly. 90 tablet 3    Fluticasone Propionate 50 MCG/ACT Nasal Suspension by Each Nare route daily.      buPROPion HCl ER, XL, 150 MG Oral Tablet 24 Hr Take 1 tablet (150 mg total) by mouth daily.      cetirizine 10 MG Oral Tab Take 1 tablet (10 mg total) by mouth daily.      OXcarbazepine 150 MG Oral Tab Take 1 tablet (150 mg total) by mouth 2 (two) times daily.       Allergies:No Known Allergies    Objective:   Physical Exam  Constitutional:       Appearance: Normal appearance.   Skin:     Findings: Rash present.      Comments: Small erythematous rash of the chest, right upper arm and back of neck. Non-tender. No vesicles. No pus or drainage.   Neurological:      Mental Status: She is alert.   Psychiatric:         Mood and Affect: Mood normal.         Assessment & Plan:   1. Rash and nonspecific skin eruption : ? Abrasion:  - After discussion with patient, consider keflex as directed; Discussed over the counter treatments. To call if worse or not better; Follow up in 1-2 weeks if rash not resolved or as needed if worse.  Can avoid body wash as discussed.        No orders of the defined types were placed in this encounter.      Meds This Visit:  Requested Prescriptions     Signed Prescriptions Disp Refills    cephalexin (KEFLEX) 500 MG Oral Cap 14 capsule 0     Sig: Take 1 capsule (500 mg total) by mouth 2 (two) times daily.       Imaging & Referrals:  None

## 2024-08-16 ENCOUNTER — TELEPHONE (OUTPATIENT)
Dept: FAMILY MEDICINE CLINIC | Facility: CLINIC | Age: 69
End: 2024-08-16

## 2024-08-16 NOTE — TELEPHONE ENCOUNTER
Patient had office visit with Dr Cordon yesterday and given Keflex.     Patient is concerned that just a little while ago she started to feel warm and is concerned it is the Keflex.  I also have allergies and the pollen/mold counts are high and I get warm when that happens too.  I am going on vacation and just concerned that I will have a reaction to the antibiotic\".    Patient denies: SOB, tightness in throat, swollen lips/tongue, diarrhea, nausea,    Patient does not have thermometer \"but my forehead doesn't feel warm, just the back of my neck\"    Patient is asking if ok to continue to take the Keflex.  Please advise.     Rash from yesterday is still present, but has not worsened.     Dr Cordon not available, routed to N for assist.

## 2024-08-16 NOTE — TELEPHONE ENCOUNTER
Left detailed message on patient's voicemail, ok per verbal release form. Advised on message to call and speak to a nurse with questions. Office phone number and hours provided. Also suggested walk-in care over the weekend if any symptoms need assessment or ER/911 with any emergent symptoms.

## 2024-08-16 NOTE — TELEPHONE ENCOUNTER
Message reviewed below  Okay to continue Keflex  Stop keflex if rash spreads or you start to have shortness of breath or difficulty breathing

## 2024-08-30 ENCOUNTER — HOSPITAL ENCOUNTER (OUTPATIENT)
Dept: ULTRASOUND IMAGING | Facility: HOSPITAL | Age: 69
Discharge: HOME | End: 2024-08-30
Payer: MEDICARE

## 2024-08-30 DIAGNOSIS — R60.0 BILATERAL LOWER EXTREMITY EDEMA: ICD-10-CM

## 2024-08-30 PROCEDURE — 93970 EXTREMITY STUDY: CPT

## 2024-09-10 ENCOUNTER — NURSE TRIAGE (OUTPATIENT)
Dept: FAMILY MEDICINE CLINIC | Facility: CLINIC | Age: 69
End: 2024-09-10

## 2024-09-10 DIAGNOSIS — M25.561 CHRONIC PAIN OF RIGHT KNEE: Primary | ICD-10-CM

## 2024-09-10 DIAGNOSIS — G89.29 CHRONIC PAIN OF RIGHT KNEE: Primary | ICD-10-CM

## 2024-09-10 NOTE — TELEPHONE ENCOUNTER
Patient notified with understanding.  Nurse advised over the counter tylenol is ok to take as well (per protocol) to follow package instructions and not to exceed 4 grams per day.  Ice also recommended.  She verbalized understanding and compliance.  Referral information sent via [employer].

## 2024-09-10 NOTE — TELEPHONE ENCOUNTER
Action Requested: Summary for Provider     []  Critical Lab, Recommendations Needed  [x] Need Additional Advice  []   FYI    []   Need Orders  [] Need Medications Sent to Pharmacy  []  Other     SUMMARY: Patient saw Dr Zavala for her right knee pain on 7/26/2024. Has been using the voltaren gel 4 times daily, doing physical therapy 2 times per week, and taking acetaminophen. If sits for more than 15 minutes and tries to get up has the knee pain. No other symptoms. Wanted to know what else she can do? Please advise.     Reason for call: Condition Update (Right knee pain/)  Onset: Data Unavailable    Reason for Disposition   Knee pain is a chronic symptom (recurrent or ongoing AND lasting > 4 weeks)    Protocols used: Knee Pain-A-OH

## 2024-09-16 ENCOUNTER — APPOINTMENT (OUTPATIENT)
Dept: BEHAVIORAL HEALTH | Age: 69
End: 2024-09-16

## 2024-09-16 DIAGNOSIS — F33.41 RECURRENT MAJOR DEPRESSIVE DISORDER, IN PARTIAL REMISSION (CMD): ICD-10-CM

## 2024-09-16 DIAGNOSIS — E66.9 OBESITY (BMI 30-39.9): ICD-10-CM

## 2024-09-16 DIAGNOSIS — F31.63 BIPOLAR 1 DISORDER, MIXED, SEVERE  (CMD): Primary | ICD-10-CM

## 2024-09-16 DIAGNOSIS — F41.1 GENERALIZED ANXIETY DISORDER: ICD-10-CM

## 2024-09-18 NOTE — LETTER
Bailee Ramírez, [address]  [address], [place]       01/09/20        Patient: Sherice Yang   YOB: 1955   Date of Visit: 1/9/2020       Dear  Dr. Erleen Kussmaul Recipients,      Thank you for referring Sherice Yang to my practice.   Mati Encounter Date: 9/18/2024       History     Chief Complaint   Patient presents with    Urinary Tract Infection     76 year old female presents to ED with complaint of abdominal pain and urinary symptoms. Patient states she was treated for a UTI approximately 2 weeks ago and has not received symptom relief from prescribed medicine regimen. She states she took the medicine for 7 days as prescribed. For the last 5 days, she reports pain with sitting and urinating. She reports pain is an 8 on 0-10 scale. Denies hematuria but reports urine is an odd orange color. Denies fever, chills, nausea/vomiting. Patient was scheduled for outpatient CT but states pain has worsened to where she is unable to wait for scheduled diagnostic.    The history is provided by the patient and medical records.     Review of patient's allergies indicates:  No Known Allergies  History reviewed. No pertinent past medical history.  Past Surgical History:   Procedure Laterality Date    HYSTERECTOMY       No family history on file.  Social History     Tobacco Use    Smoking status: Never     Passive exposure: Never    Smokeless tobacco: Never     Review of Systems   Constitutional:  Negative for chills and fever.   Eyes:  Negative for photophobia and visual disturbance.   Respiratory:  Negative for cough and shortness of breath.    Cardiovascular:  Negative for chest pain and palpitations.   Gastrointestinal:  Positive for abdominal pain. Negative for nausea and vomiting.   Genitourinary:  Positive for dysuria. Negative for hematuria.   Musculoskeletal:  Negative for arthralgias and gait problem.   Skin:  Negative for color change and wound.   Neurological:  Negative for dizziness and weakness.   Hematological:  Negative for adenopathy. Does not bruise/bleed easily.   Psychiatric/Behavioral:  Negative for agitation and suicidal ideas.    All other systems reviewed and are negative.      Physical Exam     Initial Vitals [09/18/24 1216]   BP Pulse Resp  Temp SpO2   (!) 163/84 87 20 98.9 °F (37.2 °C) 97 %      MAP       --         Physical Exam    Nursing note and vitals reviewed.  Constitutional: She appears well-developed and well-nourished.   HENT:   Head: Normocephalic and atraumatic.   Eyes: EOM are normal. Pupils are equal, round, and reactive to light.   Neck: Neck supple.   Normal range of motion.  Cardiovascular:  Normal rate and regular rhythm.           No murmur heard.  Pulmonary/Chest: She has no wheezes. She has no rhonchi.   Abdominal: Abdomen is soft. She exhibits no distension. There is abdominal tenderness.   Musculoskeletal:         General: No tenderness or edema.      Cervical back: Normal range of motion and neck supple.     Lymphadenopathy:     She has no cervical adenopathy.   Neurological: She is alert and oriented to person, place, and time. No cranial nerve deficit or sensory deficit.   Skin: Skin is warm and dry. Capillary refill takes less than 2 seconds.   Psychiatric: She has a normal mood and affect. Thought content normal.         Medical Screening Exam   See Full Note    ED Course   Procedures  Labs Reviewed   COMPREHENSIVE METABOLIC PANEL - Abnormal       Result Value    Sodium 140      Potassium 4.2      Chloride 108 (*)     CO2 28      Anion Gap 8      Glucose 144 (*)     BUN 10      Creatinine 1.11 (*)     BUN/Creatinine Ratio 9      Calcium 9.7      Total Protein 7.8      Albumin 4.2      Globulin 3.6      A/G Ratio 1.2      Bilirubin, Total 0.7      Alk Phos 108      ALT 25      AST 13 (*)     eGFR 52 (*)    URINALYSIS, REFLEX TO URINE CULTURE - Abnormal    Color, UA Yellow      Clarity, UA Clear      pH, UA 5.5      Leukocytes, UA Negative      Nitrites, UA Negative      Protein, UA 20 (*)     Glucose, UA Normal      Ketones, UA Negative      Urobilinogen, UA Normal      Bilirubin, UA Negative      Blood, UA Negative      Specific Gravity, UA 1.027     CBC WITH DIFFERENTIAL - Abnormal    WBC 9.02      RBC 5.45 (*)      Hemoglobin 14.8      Hematocrit 46.6      MCV 85.5      MCH 27.2      MCHC 31.8 (*)     RDW 12.7      Platelet Count 228      MPV 12.1      Neutrophils % 63.8      Lymphocytes % 22.6 (*)     Monocytes % 8.9 (*)     Eosinophils % 3.0      Basophils % 0.8      Immature Granulocytes % 0.9 (*)     nRBC, Auto 0.0      Neutrophils, Abs 5.76      Lymphocytes, Absolute 2.04      Monocytes, Absolute 0.80      Eosinophils, Absolute 0.27      Basophils, Absolute 0.07      Immature Granulocytes, Absolute 0.08 (*)     nRBC, Absolute 0.00      Diff Type Auto     LIPASE - Normal    Lipase 20     CBC W/ AUTO DIFFERENTIAL    Narrative:     The following orders were created for panel order CBC W/ AUTO DIFFERENTIAL.  Procedure                               Abnormality         Status                     ---------                               -----------         ------                     CBC with Differential[[id number]]       Abnormal            Final result                 Please view results for these tests on the individual orders.          Imaging Results              CT Abdomen Pelvis  Without Contrast (No Result on File)                       CT Abdomen Pelvis  Without Contrast (Final result)  Result time 09/18/24 14:09:10      Final result by Ramakrishna Corley MD (09/18/24 14:09:10)                   Impression:      This report was flagged in Epic as abnormal.    1. Findings concerning for acute diverticulitis involving the sigmoid colon.  No large volume free air, no convincing abscess.  Follow-up colonoscopy is recommended to exclude underlying lesion.  2. No findings to suggest obstructive uropathy.  3. Please see above for several additional findings.      Electronically signed by: Ramakrishna Corley MD  Date:    09/18/2024  Time:    14:09               Narrative:    EXAMINATION:  CT ABDOMEN PELVIS WITHOUT CONTRAST    CLINICAL HISTORY:  Abdominal pain, acute, nonlocalized;    TECHNIQUE:  Low dose axial images, sagittal and  coronal reformations were obtained from the lung bases to the pubic symphysis.  Oral contrast was not administered.    COMPARISON:  None    FINDINGS:  Images lower thorax are unremarkable.    The liver, spleen, pancreas and adrenal glands are grossly unremarkable.  The gallbladder is surgically absent.  No significant biliary dilation or ascites.    The kidneys have a grossly unremarkable noncontrast appearance without hydronephrosis or nephrolithiasis.  The bilateral ureters are unremarkable without calculi seen.  The urinary bladder is unremarkable.  The uterus is absent the adnexa is unremarkable.    There are several scattered colonic diverticula.  There is inflammation about several diverticula involving the mid sigmoid colon concerning for diverticulitis.  No large volume free air.  No focal organized fluid collection to suggest abscess.  The terminal ileum and appendix are unremarkable.  The small bowel is grossly unremarkable.  There are a few scattered shotty periaortic, pericaval, and mesenteric lymph nodes.  There is atherosclerotic calcification of the aorta and its branches.    There are degenerative changes of the spine.  No significant inguinal lymphadenopathy.                                       Medications   0.9%  NaCl infusion (has no administration in time range)   ciprofloxacin (CIPRO)400mg/200ml D5W IVPB 400 mg (has no administration in time range)   metronidazole IVPB 500 mg (has no administration in time range)     Medical Decision Making  76 year old female presents to ED with complaint of abdominal pain and urinary symptoms. Patient states she was treated for a UTI approximately 2 weeks ago and has not received symptom relief from prescribed medicine regimen. She states she took the medicine for 7 days as prescribed. For the last 5 days, she reports pain with sitting and urinating. She reports pain is an 8 on 0-10 scale. Denies hematuria but reports urine is an odd orange color. Denies  fever, chills, nausea/vomiting. Patient was scheduled for outpatient CT but states pain has worsened to where she is unable to wait for scheduled diagnostic.    Labs, diagnostics obtained and reviewed   IV normal saline, Cipro, Flagyl administered   Prescriptions provided  GI referral placed; patient reports that she missed her colonoscopy that was scheduled for last year with recommendations from CT to have follow-up colonoscopy    Amount and/or Complexity of Data Reviewed  Labs: ordered. Decision-making details documented in ED Course.  Radiology: ordered.     Details: 1. Findings concerning for acute diverticulitis involving the sigmoid colon.  No large volume free air, no convincing abscess.  Follow-up colonoscopy is recommended to exclude underlying lesion.      Risk  Prescription drug management.                                      Clinical Impression:   Final diagnoses:  [R10.30] Lower abdominal pain  [K57.92] Diverticulitis (Primary)               Isabel Paz, [facility]  09/18/24 [id number]

## 2024-09-19 ENCOUNTER — TELEPHONE (OUTPATIENT)
Dept: ORTHOPEDICS CLINIC | Facility: CLINIC | Age: 69
End: 2024-09-19

## 2024-09-19 DIAGNOSIS — M25.561 RIGHT KNEE PAIN, UNSPECIFIED CHRONICITY: Primary | ICD-10-CM

## 2024-09-19 DIAGNOSIS — Z01.89 ENCOUNTER FOR LOWER EXTREMITY COMPARISON IMAGING STUDY: ICD-10-CM

## 2024-09-19 NOTE — TELEPHONE ENCOUNTER
Patient is scheduled for right knee arthritis. X-rays in [facility] from 7/26. Please advise if additional imaging is needed.  Future Appointments   Date Time Provider Department Center   9/24/2024 11:00 AM Brigitte Gardiner MD [facility]   10/7/2024  1:00 PM Holly Page MD [facility] [place]

## 2024-09-23 NOTE — TELEPHONE ENCOUNTER
Patient aware    Future Appointments   Date Time Provider Department Center   9/24/2024 11:00 AM Brigitte Gardiner MD [facility]   10/7/2024 12:30 PM [facility] [place]   10/7/2024  1:00 PM Holly Page MD [facility] [place]   10/7/2024  1:00 PM [facility] XR IC [facility]1 [facility] [place]

## 2024-09-24 ENCOUNTER — LAB ENCOUNTER (OUTPATIENT)
Dept: LAB | Age: 69
End: 2024-09-24
Attending: FAMILY MEDICINE
Payer: MEDICARE

## 2024-09-24 ENCOUNTER — OFFICE VISIT (OUTPATIENT)
Dept: FAMILY MEDICINE CLINIC | Facility: CLINIC | Age: 69
End: 2024-09-24

## 2024-09-24 VITALS
TEMPERATURE: 98 F | BODY MASS INDEX: 30.09 KG/M2 | DIASTOLIC BLOOD PRESSURE: 81 MMHG | WEIGHT: 185 LBS | SYSTOLIC BLOOD PRESSURE: 132 MMHG | HEIGHT: 65.75 IN | OXYGEN SATURATION: 98 % | HEART RATE: 79 BPM

## 2024-09-24 DIAGNOSIS — L98.9 ARM SKIN LESION, RIGHT: ICD-10-CM

## 2024-09-24 DIAGNOSIS — Z12.31 VISIT FOR SCREENING MAMMOGRAM: ICD-10-CM

## 2024-09-24 DIAGNOSIS — E66.09 CLASS 1 OBESITY DUE TO EXCESS CALORIES WITH SERIOUS COMORBIDITY AND BODY MASS INDEX (BMI) OF 30.0 TO 30.9 IN ADULT: ICD-10-CM

## 2024-09-24 DIAGNOSIS — M85.89 OSTEOPENIA OF MULTIPLE SITES: ICD-10-CM

## 2024-09-24 DIAGNOSIS — J30.89 ENVIRONMENTAL AND SEASONAL ALLERGIES: ICD-10-CM

## 2024-09-24 DIAGNOSIS — I10 ESSENTIAL HYPERTENSION: ICD-10-CM

## 2024-09-24 DIAGNOSIS — I87.2 VENOUS INSUFFICIENCY OF BOTH LOWER EXTREMITIES: ICD-10-CM

## 2024-09-24 DIAGNOSIS — Z01.419 ENCOUNTER FOR GYNECOLOGICAL EXAMINATION WITHOUT ABNORMAL FINDING: ICD-10-CM

## 2024-09-24 DIAGNOSIS — M25.561 RIGHT KNEE PAIN, UNSPECIFIED CHRONICITY: ICD-10-CM

## 2024-09-24 DIAGNOSIS — F31.70 BIPOLAR DISORDER IN FULL REMISSION, MOST RECENT EPISODE UNSPECIFIED TYPE (HCC): ICD-10-CM

## 2024-09-24 DIAGNOSIS — Z00.00 ENCOUNTER FOR ANNUAL HEALTH EXAMINATION: Primary | ICD-10-CM

## 2024-09-24 DIAGNOSIS — N18.32 STAGE 3B CHRONIC KIDNEY DISEASE (HCC): ICD-10-CM

## 2024-09-24 DIAGNOSIS — H53.453 PERIPHERAL VISION LOSS, BILATERAL: ICD-10-CM

## 2024-09-24 LAB
ANION GAP SERPL CALC-SCNC: 6 MMOL/L (ref 0–18)
BUN BLD-MCNC: 27 MG/DL (ref 9–23)
BUN/CREAT SERPL: 24.3 (ref 10–20)
CALCIUM BLD-MCNC: 10.2 MG/DL (ref 8.7–10.4)
CHLORIDE SERPL-SCNC: 106 MMOL/L (ref 98–112)
CHOLEST SERPL-MCNC: 247 MG/DL (ref ?–200)
CO2 SERPL-SCNC: 25 MMOL/L (ref 21–32)
CREAT BLD-MCNC: 1.11 MG/DL
DEPRECATED RDW RBC AUTO: 42.2 FL (ref 35.1–46.3)
EGFRCR SERPLBLD CKD-EPI 2021: 54 ML/MIN/1.73M2 (ref 60–?)
ERYTHROCYTE [DISTWIDTH] IN BLOOD BY AUTOMATED COUNT: 13 % (ref 11–15)
FASTING PATIENT LIPID ANSWER: NO
FASTING STATUS PATIENT QL REPORTED: NO
GLUCOSE BLD-MCNC: 74 MG/DL (ref 70–99)
HCT VFR BLD AUTO: 41.1 %
HDLC SERPL-MCNC: 71 MG/DL (ref 40–59)
HGB BLD-MCNC: 13.9 G/DL
LDLC SERPL CALC-MCNC: 157 MG/DL (ref ?–100)
MCH RBC QN AUTO: 30.3 PG (ref 26–34)
MCHC RBC AUTO-ENTMCNC: 33.8 G/DL (ref 31–37)
MCV RBC AUTO: 89.7 FL
NONHDLC SERPL-MCNC: 176 MG/DL (ref ?–130)
OSMOLALITY SERPL CALC.SUM OF ELEC: 288 MOSM/KG (ref 275–295)
PLATELET # BLD AUTO: 277 10(3)UL (ref 150–450)
POTASSIUM SERPL-SCNC: 4 MMOL/L (ref 3.5–5.1)
RBC # BLD AUTO: 4.58 X10(6)UL
SODIUM SERPL-SCNC: 137 MMOL/L (ref 136–145)
TRIGL SERPL-MCNC: 107 MG/DL (ref 30–149)
VLDLC SERPL CALC-MCNC: 21 MG/DL (ref 0–30)
WBC # BLD AUTO: 8 X10(3) UL (ref 4–11)

## 2024-09-24 PROCEDURE — 88175 CYTOPATH C/V AUTO FLUID REDO: CPT | Performed by: FAMILY MEDICINE

## 2024-09-24 PROCEDURE — 36415 COLL VENOUS BLD VENIPUNCTURE: CPT

## 2024-09-24 PROCEDURE — 80048 BASIC METABOLIC PNL TOTAL CA: CPT

## 2024-09-24 PROCEDURE — 80061 LIPID PANEL: CPT

## 2024-09-24 PROCEDURE — 87624 HPV HI-RISK TYP POOLED RSLT: CPT | Performed by: FAMILY MEDICINE

## 2024-09-24 PROCEDURE — 85027 COMPLETE CBC AUTOMATED: CPT

## 2024-09-24 NOTE — PROGRESS NOTES
Subjective:   Stephanie Cavanaugh is a 69 year old female who presents for a Medicare Subsequent Annual Wellness visit (Pt already had Initial Annual Wellness) and  issues as below .   Mother  yesterday, experiencing grief but otherwise has been well.    History/Other:   Fall Risk Assessment:   She has been screened for Falls and is High Risk. Fall Prevention information provided to patient in After Visit Summary.    Do you feel unsteady when standing or walking?: No  Do you worry about falling?: No  Have you fallen in the past year?: Yes  How many times have you fallen?: 1  Were you injured?: No     Cognitive Assessment:   Abnormal  What day of the week is this?: Incorrect  What month is it?: Correct  What year is it?: Correct  Recall \"Ball\": Incorrect  Recall \"Flag\": Incorrect  Recall \"Tree\": Incorrect    Functional Ability/Status:   Stephanie Cavanaugh has some abnormal functions as listed below:  She has Driving difficulties based on screening of functional status. She has difficulties Shopping for Groceries based on screening of functional status. She has Vision problems based on screening of functional status. She has Walking problems based on screening of functional status.       Depression Screening (PHQ):  PHQ-2 SCORE: 0  , done 2024             Advanced Directives:   She does NOT have a Living Will. [Do you have a living will?: No]  She does have a POA but we do NOT have it on file in Epic.    Discussed Advance Care Planning with patient (and family/surrogate if present). Standard forms made available to patient in After Visit Summary.      Patient Active Problem List   Diagnosis    Environmental and seasonal allergies    Bipolar disorder in full remission (HCC)    Essential hypertension    Venous insufficiency of both lower extremities    Chronic kidney disease (CKD), stage III (moderate) ([facility])    Peripheral vision loss, bilateral    Osteopenia of multiple sites     Allergies:  She has No Known  Allergies.    Current Medications:  Outpatient Medications Marked as Taking for the 9/24/24 encounter (Office Visit) with Brigitte Gardiner MD   Medication Sig    Zoster Vac Recomb Adjuvanted 50 MCG/0.5ML Intramuscular Recon Susp Inject 50 mcg into the muscle once for 1 dose. Please administer vaccine at pharmacy    amLODIPine Besy-Benazepril HCl 10-20 MG Oral Cap Take 1 capsule by mouth daily.    montelukast 10 MG Oral Tab Take 1 tablet (10 mg total) by mouth nightly.    Fluticasone Propionate 50 MCG/ACT Nasal Suspension by Each Nare route daily.    buPROPion HCl ER, XL, 150 MG Oral Tablet 24 Hr Take 1 tablet (150 mg total) by mouth daily.    cetirizine 10 MG Oral Tab Take 1 tablet (10 mg total) by mouth daily.    OXcarbazepine 150 MG Oral Tab Take 1 tablet (150 mg total) by mouth 2 (two) times daily.       Medical History:  She  has a past medical history of After cataract not obscuring vision, bilateral (3/5/2020), Amblyopia, left, Bilateral dry eyes (3/5/2020), Floater, vitreous, bilateral (3/5/2020), and Wrist fracture, closed.  Surgical History:  She  has a past surgical history that includes Strabismus surgery (Bilateral, 1960 and 1962) and Cataract extraction w/  intraocular lens implant (Bilateral, 2015).   Family History:  Her family history is not on file.  Social History:  She  reports that she has never smoked. She has never used smokeless tobacco. She reports that she does not currently use alcohol. She reports that she does not currently use drugs.    Tobacco:  She has never smoked tobacco.    CAGE Alcohol Screen:   CAGE screening score of 0 on 9/24/2024, showing low risk of alcohol abuse.      Patient Care Team:  Brigitte Gardiner MD as PCP - General (Family Medicine)  Rajiv Ann APRN (Nurse Practitioner Family)    Review of Systems  See below    Objective:   Physical Exam  General Appearance:  Alert, cooperative, no distress, appears stated age   Head:  Normocephalic, without obvious  abnormality, atraumatic   Eyes:  PERRL, conjunctiva/corneas clear, EOM's intact both eyes   Ears:  Normal TM's and external ear canals, both ears   Nose: Nares normal, septum midline,mucosa normal, no drainage or sinus tenderness   Throat: Lips, mucosa, and tongue normal; teeth and gums normal   Neck: Supple, symmetrical, trachea midline, no adenopathy;  thyroid: not enlarged, symmetric, no tenderness/mass/nodules; no carotid bruit or JVD   Back:   Symmetric, no curvature, ROM normal, no CVA tenderness   Lungs:   Clear to auscultation bilaterally, respirations unlabored   Heart:  Regular rate and rhythm, S1 and S2 normal, no murmur, rub, or gallop   Abdomen:   Soft, non-tender, bowel sounds active all four quadrants,  no masses, no organomegaly   Pelvic: Deferred   Extremities: Extremities normal, atraumatic, no cyanosis or edema   Pulses: 2+ and symmetric   Skin: Right lateral upper arm with erythematous maculopapular cluster about 2 x 1 cm.  Multiple seborrheic keratoses.   Lymph nodes: Cervical, supraclavicular, and axillary nodes normal   Neurologic: Normal    and Breasts:  normal appearance, no masses or tenderness    /81   Pulse 79   Temp 98.3 °F (36.8 °C) (Oral)   Ht 5' 5.75\" (1.67 m)   Wt 185 lb (83.9 kg)   SpO2 98%   BMI 30.09 kg/m²  Estimated body mass index is 30.09 kg/m² as calculated from the following:    Height as of this encounter: 5' 5.75\" (1.67 m).    Weight as of this encounter: 185 lb (83.9 kg).    Medicare Hearing Assessment:   Hearing Screening    Time taken: 9/24/2024 11:17 AM  Entry User: [employer]  Screening Method: Finger Rub  Finger Rub Result: Pass         Visual Acuity:   Right Eye Visual Acuity: Uncorrected Right Eye Chart Acuity: 20/25   Left Eye Visual Acuity: Uncorrected Left Eye Chart Acuity: 20/25   Both Eyes Visual Acuity: Uncorrected Both Eyes Chart Acuity: 20/30   Able To Tolerate Visual Acuity: Yes        Assessment & Plan:   Stephanie Cavanaugh is a 69 year old  female who presents for a Medicare Assessment.     1. Encounter for annual health examination (Primary)-patient is , postmenopausal without spotting.  Encourage regular exercise, more difficult recently with knee pain interfering with walking.  Mother  yesterday, experiencing grief but glad she got to see her 2 days prior.  Labs done today  Recommend Shingrix at pharmacy  2. Visit for screening mammogram-order given  -     [facility] LETTY 2D+3D SCREENING BILAT (CPT=77067/82223); Future; Expected date: 2024  3. Bipolar disorder in full remission, most recent episode unspecified type (HCC)-stable on current medication  4. Stage 3b chronic kidney disease (HCC)-maintain hydration, avoid nephrotoxins.  Control blood pressure.  Overview:  10/2019-GFR 40, renal ultrasound in scanned records.  5. Essential hypertension-blood pressure controlled with amlodipine/benazepril  -     Basic Metabolic Panel (8); Future; Expected date: 2024  -     Lipid Panel; Future; Expected date: 2024  -     CBC, Platelet; No Differential; Future; Expected date: 2024  6. Venous insufficiency of both lower extremities-recently saw vascular surgeon, recommended support hose.  7. Osteopenia of multiple sites  Overview:  2024-weightbearing exercise, vitamin D supplement, calcium through diet.  Recheck bone density in 3 years.  8. Environmental and seasonal allergies-symptoms controlled with current medication  9. Peripheral vision loss, bilateral  Overview:  [facility]- due to optic nerve issue, unable to drive.  10. Class 1 obesity due to excess calories with serious comorbidity and body mass index (BMI) of 30.0 to 30.9 in adult  11. Right knee pain, unspecified chronicity-has been participating in physical therapy, using Voltaren gel.  X-ray mild arthritis.  Has upcoming appointment with orthopedics.  12. Arm skin lesion, right-lateral right arm.  Recently seen and treated with cephalexin.  Not resolved.   Referred to dermatology.  -     DERM - INTERNAL  13. Encounter for gynecological examination without abnormal finding  -     ThinPrep PAP with HPV Reflex Request B; Future; Expected date: 09/24/2024  Other orders  -     Zoster Vac Recomb Adjuvanted; Inject 50 mcg into the muscle once for 1 dose. Please administer vaccine at pharmacy  Dispense: 1 each; Refill: 1    The patient indicates understanding of these issues and agrees to the plan.  Imaging studies ordered.  Lab work ordered.  Reinforced healthy diet, lifestyle, and exercise.      Return in about 6 months (around 3/24/2025) for hypertension.     Brigitte Gardiner MD, 9/24/2024     Supplementary Documentation:   General Health:  In the past six months, have you lost more than 10 pounds without trying?: 2 - No  Has your appetite been poor?: No  Type of Diet: Balanced  How does the patient maintain a good energy level?: Daily Walks  How would you describe your daily physical activity?: Moderate  How would you describe your current health state?: Good  How do you maintain positive mental well-being?: Social Interaction;Puzzles;Games;Visiting Friends;Visiting Family  On a scale of 0 to 10, with 0 being no pain and 10 being severe pain, what is your pain level?: 5 - (Moderate)  In the past six months, have you experienced urine leakage?: 0-No  At any time do you feel concerned for the safety/well-being of yourself and/or your children, in your home or elsewhere?: No  Have you had any immunizations at another office such as Influenza, Hepatitis B, Tetanus, or Pneumococcal?: No    Health Maintenance   Topic Date Due    Colorectal Cancer Screening  Never done    Zoster Vaccines (1 of 2) Never done    Pap Smear  07/23/2024    COVID-19 Vaccine (3 - 2023-24 season) 09/01/2024    Annual Physical  09/19/2024    Mammogram  11/20/2024    Influenza Vaccine (1) 10/01/2024    DEXA Scan  Completed    Annual Depression Screening  Completed    Fall Risk Screening (Annual)  Completed     Pneumococcal Vaccine: 65+ Years  Completed

## 2024-09-26 LAB — HPV E6+E7 MRNA CVX QL NAA+PROBE: NEGATIVE

## 2024-09-30 ENCOUNTER — MED REC SCAN ONLY (OUTPATIENT)
Dept: FAMILY MEDICINE CLINIC | Facility: CLINIC | Age: 69
End: 2024-09-30

## 2024-10-07 ENCOUNTER — OFFICE VISIT (OUTPATIENT)
Dept: ORTHOPEDICS CLINIC | Facility: CLINIC | Age: 69
End: 2024-10-07
Payer: MEDICARE

## 2024-10-07 ENCOUNTER — HOSPITAL ENCOUNTER (OUTPATIENT)
Dept: GENERAL RADIOLOGY | Age: 69
Discharge: HOME | End: 2024-10-07
Attending: ORTHOPAEDIC SURGERY
Payer: MEDICARE

## 2024-10-07 VITALS — HEIGHT: 65 IN | WEIGHT: 185 LBS | BODY MASS INDEX: 30.82 KG/M2

## 2024-10-07 DIAGNOSIS — Z01.89 ENCOUNTER FOR LOWER EXTREMITY COMPARISON IMAGING STUDY: ICD-10-CM

## 2024-10-07 DIAGNOSIS — M17.31 POST-TRAUMATIC OSTEOARTHRITIS OF RIGHT KNEE: Primary | ICD-10-CM

## 2024-10-07 DIAGNOSIS — M25.561 RIGHT KNEE PAIN, UNSPECIFIED CHRONICITY: ICD-10-CM

## 2024-10-07 PROCEDURE — 99204 OFFICE O/P NEW MOD 45 MIN: CPT | Performed by: ORTHOPAEDIC SURGERY

## 2024-10-07 PROCEDURE — 73562 X-RAY EXAM OF KNEE 3: CPT | Performed by: ORTHOPAEDIC SURGERY

## 2024-10-07 PROCEDURE — 73564 X-RAY EXAM KNEE 4 OR MORE: CPT | Performed by: ORTHOPAEDIC SURGERY

## 2024-10-07 NOTE — PROGRESS NOTES
[facility] New Consult      Chief Complaint   Patient presents with    Knee Pain     RIGHT KNEE  -Has fallen on the knee a couple of times  -Ambulating with cane   -Pain when she gets up after prolonged sitting       HPI: The patient is a 69 year old female referred for orthopaedic consultation by Dr. Dano Zavala with complaints of chronic right knee pain.  Pain is localized anteriorly and medially with prolonged standing and walking.  She is now using a cane as an assistive device due to a history of frequent falling perhaps related to her deteriorating vision and balance issues.  Prolonged sitting results in pain and stiffness which tends to improve as she is up and walking.  She denies catching, locking, instability, warmth or swelling.  She is limited from anti-inflammatory use and depends on Tylenol which is minimally beneficial.    Past Medical History:    After cataract not obscuring vision, bilateral    Amblyopia, left    Bilateral dry eyes    Floater, vitreous, bilateral    Wrist fracture, closed    right wrist fracture     Past Surgical History:   Procedure Laterality Date    Cataract extraction w/  intraocular lens implant Bilateral 2015    [facility]    Strabismus surgery Bilateral 1960 and 1962    eye muscle surgery at age 5 and 7 for \"crossed eye\"      Current Outpatient Medications   Medication Sig Dispense Refill    amLODIPine Besy-Benazepril HCl 10-20 MG Oral Cap Take 1 capsule by mouth daily. 90 capsule 3    montelukast 10 MG Oral Tab Take 1 tablet (10 mg total) by mouth nightly. 90 tablet 3    Fluticasone Propionate 50 MCG/ACT Nasal Suspension by Each Nare route daily.      buPROPion HCl ER, XL, 150 MG Oral Tablet 24 Hr Take 1 tablet (150 mg total) by mouth daily.      cetirizine 10 MG Oral Tab Take 1 tablet (10 mg total) by mouth daily.      OXcarbazepine 150 MG Oral Tab Take 1 tablet (150 mg total) by mouth 2 (two) times daily.       No Known Allergies  Family  History   Problem Relation Age of Onset    Diabetes Neg     Glaucoma Neg     Macular degeneration Neg      Social History     Occupational History    Not on file   Tobacco Use    Smoking status: Never    Smokeless tobacco: Never   Vaping Use    Vaping status: Never Used   Substance and Sexual Activity    Alcohol use: Not Currently    Drug use: Not Currently    Sexual activity: Not on file        ROS:  Complete ROS reviewed by me and non-contributory to the chief complaint except as mentioned above.    Physical Exam:    Ht 5' 5\" (1.651 m)   Wt 185 lb (83.9 kg)   BMI 30.79 kg/m²   Constitutional: Well developed, well nourished 69 year old female presenting with her older sister  Psychological: NAD, alert and appropriate  Respiratory: Breathing comfortably on room air with RR of 10-14  Cardiac: Palpable distal pulses with pink warm extremities distally  Right knee:  Inspection reveals no significant discoloration or deformity.  Palpation reveals significant effusion.  Range of motion is adequate extension and flexion to at least 125 degrees.  Medial joint line is tender to palpation.  Collaterals are stable on varus valgus stress.  Lachman and posterior drawer are negative.  Popliteal space is nontender.  No significant distal edema is noted.  Neurovascular status is intact on sensory, motor and perfusion assessment distally.      Imaging:    Multiple views right knee personally viewed, independently interpreted and radiology report read.  Trace cortical irregularity at the distal medial femoral condyle is suggestive of early arthrosis, possible post-traumatic in nature.    US VENOUS INSUFFICIENCY (REFLUX) BILAT LOWER EXT (CPT=93970)    Result Date: 8/30/2024  CONCLUSION:  1. Negative bilateral leg venous duplex exam without deep venous thrombosis. 2. No evidence of venous insufficiency in the right GSV or SSV. 3. Mild Venous insufficiency left saphenofemoral junction (SFJ) and the left vein of Giacomini.  The  remaining segments of the left GSV and SSV demonstrate venous competence.    Dictated by (CST): Fortino Mckeon MD on 8/30/2024 at 4:13 PM     Finalized by (CST): Fortino Mckeon MD on 8/30/2024 at 4:21 PM          XR DEXA BONE DENSITOMETRY (CPT=77080)    Result Date: 8/1/2024  CONCLUSION:  1. Findings in the left femoral neck and total left hip suggest osteopenia, and there may be increased fracture risk.  The 10 year fracture risk for major osteoporotic fracture is 9.3%, and for hip fracture is 1.2%. 2. Findings in the total AP lumbar spine suggest osteopenia, and there may be increased fracture risk.    Dictated by (CST): Benedicto Guzman MD on 8/01/2024 at 4:01 PM     Finalized by (CST): Benedicto Guzman MD on 8/01/2024 at 4:02 PM          XR KNEE, COMPLETE (4 OR MORE VIEWS), RIGHT (CPT=73564)    Result Date: 7/26/2024  CONCLUSION:  1. No acute fracture or subluxation. 2. Mild osteoarthritis.    Dictated by (CST): Mario Holland MD on 7/26/2024 at 12:57 PM     Finalized by (CST): Mario Holland MD on 7/26/2024 at 12:59 PM             Assessment/Diagnoses:  Diagnoses and all orders for this visit:    Post-traumatic osteoarthritis of right knee       Plan:  I reviewed imaging and exam findings with the patient and her sister.  There is some trace suggestion of irregularity at the medial compartment consistent with degenerative arthritis although it is mild.  It is possible that repetitive falling on the flexed right knee may have resulted in bone contusion with resultant reactive sclerosis.  This can also be seen in progressive degenerative joint disease..  We discussed the etiology, natural history and treatment options in detail.  Treatments include activity modification, weight loss, anti-inflammatory use and possible injections.  In the long term, the patient's symptoms may progress and warrant consideration of total knee arthroplasty, but only if symptoms become severe.  For now, non-surgical treatment  options are recommended.  We discussed the option for knee corticosteroid injection along with the potential risks, benefits and alternatives.  In light of relatively mild symptoms, the patient elects to hold off on injection for now.  If however symptoms persist or progress further imaging with an MRI could be considered.  All questions were answered and the patient and her sister verbalized understanding and appreciation.  If symptoms are not improving with ongoing conservative care, I asked the patient to contact our office for possible advanced imaging.      Holly Page MD, [facility]  Orthopaedic Surgery   Sports Medicine/Knee and Shoulder  [facility]  t: [phone]  f: [phone]           This document was partially prepared using Dragon Medical voice recognition software.  Although every attempt is made to correct errors during dictation, discrepancies may still exist.

## 2024-10-29 RX ORDER — OXCARBAZEPINE 150 MG/1
150 TABLET, FILM COATED ORAL 2 TIMES DAILY
Qty: 180 TABLET | Refills: 0 | Status: SHIPPED | OUTPATIENT
Start: 2024-10-29

## 2024-10-29 RX ORDER — BUPROPION HYDROCHLORIDE 150 MG/1
150 TABLET ORAL DAILY
Qty: 90 TABLET | Refills: 0 | Status: SHIPPED | OUTPATIENT
Start: 2024-10-29

## 2024-11-06 ENCOUNTER — TELEPHONE (OUTPATIENT)
Facility: CLINIC | Age: 69
End: 2024-11-06

## 2024-11-06 NOTE — TELEPHONE ENCOUNTER
Patient called with some questions about visco injections. Patient would like a call back at [phone].

## 2024-11-11 ENCOUNTER — OFFICE VISIT (OUTPATIENT)
Dept: ORTHOPEDICS CLINIC | Facility: CLINIC | Age: 69
End: 2024-11-11
Payer: MEDICARE

## 2024-11-11 VITALS — HEIGHT: 65 IN | WEIGHT: 185 LBS | BODY MASS INDEX: 30.82 KG/M2

## 2024-11-11 DIAGNOSIS — M17.31 POST-TRAUMATIC OSTEOARTHRITIS OF RIGHT KNEE: Primary | ICD-10-CM

## 2024-11-11 RX ORDER — TRIAMCINOLONE ACETONIDE 40 MG/ML
40 INJECTION, SUSPENSION INTRA-ARTICULAR; INTRAMUSCULAR ONCE
Status: COMPLETED | OUTPATIENT
Start: 2024-11-11 | End: 2024-11-11

## 2024-11-11 RX ADMIN — TRIAMCINOLONE ACETONIDE 40 MG: 40 INJECTION, SUSPENSION INTRA-ARTICULAR; INTRAMUSCULAR at 14:04:00

## 2024-11-11 NOTE — PROGRESS NOTES
[facility] Progress Note      Chief Complaint:  Right knee pain      Subjective: Stephanie Cavanaugh is a 69 year old female who is here for follow-up of her right knee pain.  She was seen and evaluated by Dr. Page approximately 1 month ago and was diagnosed with posttraumatic degenerative joint disease.  At that time cortisone injection was discussed however due to the mild nature of her symptoms she wanted to hold off.  Pain has been persistent about the medial aspect of the knee and is worse with long walks and in the morning.  She notes stiffness especially in the morning that improves with walking.  She is eager to get back to walking 10,000 steps a day.      Objective: Exam of the right knee and lower extremity reveals that the overlying skin is intact.  No palpable effusion.  She has full extension and no pain with full flexion.  She is tender about the medial joint line.  No lateral joint line tenderness.  Sensation is present to light touch.      Assessment: Posttraumatic right knee degenerative joint disease      Plan: Unfortunately pain has not significantly improved since her evaluation with Dr. Page.  At this time she would like to go ahead with a cortisone injection to the right knee and this is reasonable.  She would like to proceed.  If pain is temporarily beneficial, further imaging with an MRI scan may be considered.  She will follow-up with me on an as-needed basis with worsening or persistent symptoms, questions or concerns.    Risks, benefits, and alternatives to the cortisone injection were discussed including but not limited to needle infection, steroid flare up or failure to improve. The patient would like to proceed and under meticulous sterile technique 4cc of Xylocaine, and 40 mg of Kenalog were injected to the right knee via lateral patellofemoral approach.  A band aid was placed over the injection site and they tolerated the procedure well.  Patient was instructed to follow up with any  adverse reactions.            Mercy Dexter PA-C  Orthopedic Surgery   [address] [place] [id number]   t: [phone]  f: [phone]           This document was partially prepared using Dragon Medical voice recognition software.  Although every attempt is made to correct errors during dictation, discrepancies may still exist. Please contact me with any questions or clarifications.

## 2024-11-13 ENCOUNTER — TELEPHONE (OUTPATIENT)
Dept: ORTHOPEDICS CLINIC | Facility: CLINIC | Age: 69
End: 2024-11-13

## 2024-11-13 NOTE — TELEPHONE ENCOUNTER
Pt called with follow up question from her 11/11/24 OV. Had cortisone injection. Yesterday raked leaves and overdid it, feels a flare up but able to walk. Pt uses voltaren gel and just wants to know if okay to still use at that knee. Advised to avoid topical med at site of injection to be safe. Pt states is opposite side of knee from where she had injection.

## 2024-11-29 ENCOUNTER — TELEPHONE (OUTPATIENT)
Dept: FAMILY MEDICINE CLINIC | Facility: CLINIC | Age: 69
End: 2024-11-29

## 2024-11-29 DIAGNOSIS — R92.8 ABNORMAL MAMMOGRAM: ICD-10-CM

## 2024-11-29 DIAGNOSIS — Z12.31 VISIT FOR SCREENING MAMMOGRAM: Primary | ICD-10-CM

## 2024-11-29 NOTE — TELEPHONE ENCOUNTER
See below.  FYI you can use diagnosis abnormal mammogram or breast disorder for entering diagnostic mammograms.

## 2025-01-02 ENCOUNTER — APPOINTMENT (OUTPATIENT)
Dept: BEHAVIORAL HEALTH | Age: 70
End: 2025-01-02

## 2025-01-02 DIAGNOSIS — F41.1 GENERALIZED ANXIETY DISORDER: ICD-10-CM

## 2025-01-02 DIAGNOSIS — F31.63 BIPOLAR 1 DISORDER, MIXED, SEVERE  (CMD): Primary | ICD-10-CM

## 2025-01-02 DIAGNOSIS — F33.41 RECURRENT MAJOR DEPRESSIVE DISORDER, IN PARTIAL REMISSION (CMD): ICD-10-CM

## 2025-01-02 DIAGNOSIS — E66.9 OBESITY (BMI 30-39.9): ICD-10-CM

## 2025-01-22 ENCOUNTER — TELEPHONE (OUTPATIENT)
Dept: FAMILY MEDICINE CLINIC | Facility: CLINIC | Age: 70
End: 2025-01-22

## 2025-01-22 ENCOUNTER — NURSE TRIAGE (OUTPATIENT)
Dept: FAMILY MEDICINE CLINIC | Facility: CLINIC | Age: 70
End: 2025-01-22

## 2025-01-22 ENCOUNTER — OFFICE VISIT (OUTPATIENT)
Dept: INTERNAL MEDICINE CLINIC | Facility: CLINIC | Age: 70
End: 2025-01-22

## 2025-01-22 VITALS
HEIGHT: 65 IN | SYSTOLIC BLOOD PRESSURE: 116 MMHG | DIASTOLIC BLOOD PRESSURE: 80 MMHG | OXYGEN SATURATION: 100 % | BODY MASS INDEX: 31 KG/M2 | HEART RATE: 97 BPM

## 2025-01-22 DIAGNOSIS — J01.00 ACUTE NON-RECURRENT MAXILLARY SINUSITIS: Primary | ICD-10-CM

## 2025-01-22 DIAGNOSIS — H04.123 BILATERAL DRY EYES: ICD-10-CM

## 2025-01-22 PROCEDURE — 99213 OFFICE O/P EST LOW 20 MIN: CPT | Performed by: NURSE PRACTITIONER

## 2025-01-22 RX ORDER — BUPROPION HYDROCHLORIDE 150 MG/1
150 TABLET ORAL DAILY
Qty: 90 TABLET | Refills: 0 | Status: SHIPPED | OUTPATIENT
Start: 2025-01-22

## 2025-01-22 RX ORDER — BUPROPION HYDROCHLORIDE 150 MG/1
150 TABLET ORAL DAILY
Qty: 90 TABLET | Refills: 0 | OUTPATIENT
Start: 2025-01-22

## 2025-01-22 NOTE — TELEPHONE ENCOUNTER
Action Requested: Summary for Provider     []  Critical Lab, Recommendations Needed  [] Need Additional Advice  [x]   FYI    []   Need Orders  [] Need Medications Sent to Pharmacy  []  Other     SUMMARY: Per protocol, patient should be seen in the office today or tomorrow. Appointment scheduled.    Future Appointments   Date Time Provider Department Center   1/22/2025  4:30 PM Meka Mckeon APRN [facility]     Reason for call: Nasal Congestion  Onset: Couple Weeks Ago    Patient reports her left nostril feels clogged up and complains of pain in the inner right side of her face. She has been blowing clear stuff from her nose. She is concerned she might have sinus infection and wants to be seen for evaluation. She wants to make sure since she is scheduled for dental work on Monday. She takes her regular allergy medication montelukast along with cetirizine and Flonase nasal spray. Care advice given per protocol. Patient verbalized understanding and agreed with plan of care.     Reason for Disposition   Patient wants to be seen    Protocols used: Sinus Pain or Congestion-A-[place]

## 2025-01-22 NOTE — PATIENT INSTRUCTIONS
Take Augmentin 1 pill twice daily for the next 7 days.  Recommend taking with food to avoid upset stomach.    Increase your Flonase use to 1 spray each nostril twice daily for the duration of antibiotic therapy.     For dry eyes, recommend Systane lubricating eyedrops - they have both preservative-free and regular.  Both are excellent.  Use those twice daily for your dry eyes.    Humidifier in the bedroom.

## 2025-01-22 NOTE — TELEPHONE ENCOUNTER
Verified name and .    Patient states that she will be getting diagnostic mammogram ordered by Dr. Gardiner completed at [facility] in [place].    She is asking that order be faxed to that facility- she will call back with fax number so that order can be faxed.

## 2025-01-22 NOTE — PROGRESS NOTES
Subjective:   Stephanie Cavanaugh is a 69 year old female who presents for Nasal Congestion (Has been having a runny nose and pressure in face around left eye for about 3 weeks)     Presents for evaluation of approximately 3-week history of runny nose, congestion, facial pressure, left greater than right.  Having some pain around her left eye.  No pain with EOM.  No hearing changes.  Having some ear pressure.  Chronic tinnitus in bilateral ears.  The left facial pressure is her most bothersome symptom.  She sings in the choir, and when she sings she states she has clear nasal discharge especially from her left side of her nose.  No cough, fevers, sore throat, difficulty breathing.  No abdominal pain, nausea, vomiting, diarrhea.  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  Was seen at [facility], all of her tests were inconclusive.      History/Other:    Chief Complaint Reviewed and Verified  Nursing Notes Reviewed and   Verified  Tobacco Reviewed  Allergies Reviewed  Medications Reviewed    Problem List Reviewed  Medical History Reviewed  Surgical History   Reviewed  OB Status Reviewed  Family History Reviewed         Tobacco:  She has never smoked tobacco.    Current Outpatient Medications   Medication Sig Dispense Refill    amoxicillin clavulanate 875-125 MG Oral Tab Take 1 tablet by mouth 2 (two) times daily for 7 days. 14 tablet 0    amLODIPine Besy-Benazepril HCl 10-20 MG Oral Cap Take 1 capsule by mouth daily. 90 capsule 3    montelukast 10 MG Oral Tab Take 1 tablet (10 mg total) by mouth nightly. 90 tablet 3    Fluticasone Propionate 50 MCG/ACT Nasal Suspension by Each Nare route daily.      buPROPion HCl ER, XL, 150 MG Oral Tablet 24 Hr Take 1 tablet (150 mg total) by mouth daily.      cetirizine 10 MG Oral Tab Take 1 tablet (10 mg total) by mouth daily.      OXcarbazepine 150 MG Oral Tab Take 1 tablet (150 mg total) by mouth 2 (two) times daily.            Review of Systems:  Review of Systems  10 point review of systems otherwise negative with the exception of HPI and assessment and plan.    Objective:   /80   Pulse 97   Ht 5' 5\" (1.651 m)   SpO2 100%   BMI 30.79 kg/m²  Estimated body mass index is 30.79 kg/m² as calculated from the following:    Height as of this encounter: 5' 5\" (1.651 m).    Weight as of 11/11/24: 185 lb (83.9 kg).      Physical Exam  Vitals reviewed.   Constitutional:       General: She is not in acute distress.     Appearance: She is not ill-appearing.   HENT:      Right Ear: Tympanic membrane normal.      Left Ear: Tympanic membrane is erythematous and bulging.      Nose: Congestion and rhinorrhea present. Rhinorrhea is clear.      Right Turbinates: Not enlarged.      Left Turbinates: Enlarged.      Right Sinus: Maxillary sinus tenderness present.      Left Sinus: Maxillary sinus tenderness present.      Comments: L>R maxillary tenderness     Mouth/Throat:      Mouth: Mucous membranes are moist.      Pharynx: Postnasal drip present. No pharyngeal swelling, oropharyngeal exudate or posterior oropharyngeal erythema.      Tonsils: 0 on the right. 0 on the left.   Cardiovascular:      Rate and Rhythm: Normal rate and regular rhythm.      Heart sounds: Normal heart sounds. No murmur heard.  Pulmonary:      Effort: Pulmonary effort is normal. No respiratory distress.      Breath sounds: Normal breath sounds.   Musculoskeletal:      Cervical back: Neck supple.   Lymphadenopathy:      Cervical: No cervical adenopathy.   Skin:     General: Skin is warm and dry.   Neurological:      Mental Status: She is alert.         Assessment & Plan:   1. Acute non-recurrent maxillary sinusitis (Primary)  -     Amoxicillin-Pot Clavulanate; Take 1 tablet by mouth 2 (two) times daily for 7 days.  Dispense: 14 tablet; Refill: 0  - Given duration of symptoms, maxillary sinus pressure sure, general inflammation especially on the left side of the face,  will treat with Augmentin twice daily for 7 days.  Recommended taking with food.  - Increase fluticasone use to twice daily 1 spray each nostril.  - Humidifier in the bedroom.  - Return for new fevers, failure to improve on this regimen.  - Discussed red flags regarding eye pain should it occur in her eyeball rather than just pressure behind her eye and when to seek care.  2. Bilateral dry eyes        -  Recommended purchase of over-the-counter Systane lubricant eyedrops, use twice daily for dry eyes.          Return if symptoms worsen or fail to improve.    Meka Mckeon, LJ, 1/22/2025, 4:36 PM     This note was prepared using Dragon Medical voice recognition dictation software. As a result errors may occur. When identified, these errors have been corrected. While every attempt is made to correct errors during dictation discrepancies may still exist.

## 2025-01-23 RX ORDER — OXCARBAZEPINE 150 MG/1
150 TABLET, FILM COATED ORAL 2 TIMES DAILY
Qty: 180 TABLET | Refills: 0 | Status: SHIPPED | OUTPATIENT
Start: 2025-01-23

## 2025-02-03 ENCOUNTER — OFFICE VISIT (OUTPATIENT)
Dept: ORTHOPEDICS CLINIC | Facility: CLINIC | Age: 70
End: 2025-02-03
Payer: MEDICARE

## 2025-02-03 VITALS — HEIGHT: 65 IN | BODY MASS INDEX: 30.82 KG/M2 | WEIGHT: 185 LBS

## 2025-02-03 DIAGNOSIS — M17.31 POST-TRAUMATIC OSTEOARTHRITIS OF RIGHT KNEE: Primary | ICD-10-CM

## 2025-02-03 RX ORDER — TRIAMCINOLONE ACETONIDE 40 MG/ML
40 INJECTION, SUSPENSION INTRA-ARTICULAR; INTRAMUSCULAR ONCE
Status: COMPLETED | OUTPATIENT
Start: 2025-02-03 | End: 2025-02-03

## 2025-02-03 RX ADMIN — TRIAMCINOLONE ACETONIDE 40 MG: 40 INJECTION, SUSPENSION INTRA-ARTICULAR; INTRAMUSCULAR at 12:57:00

## 2025-02-03 NOTE — PROGRESS NOTES
[facility] Progress Note      Chief Complaint:  Right knee pain      Subjective: Stephanie Cavanaugh is a 69 year old female who is here for follow-up of her right knee pain.  She was seen and evaluated approximately 12 weeks ago when she was diagnosed with posttraumatic degenerative joint disease and a cortisone injection was administered.  She states that this helped significantly and pain has started to recur over the last week.  Pain is localized to the medial aspect of the knee and is worse in the morning and loosens up throughout the day.  She denies swelling, locking or instability.  Pain is worse with twisting type motions.  She is here for further evaluation.      Objective: Exam of the right knee and lower extremity reveals that the overlying skin is intact.  She has full extension.  Trace palpable effusion.  No pain with flexion.  She is tender about the medial joint line.  No lateral joint line tenderness.  Mild discomfort with Steinmann and Fletcher.  Sensation is present to light touch.      Assessment: Posttraumatic degenerative joint disease right knee      Plan: Reviewed x-rays findings which do show mild degenerative changes in the medial compartment which is most likely causing her symptoms.  She is requesting a repeat cortisone injection due to the recurrent nature of her symptoms and this would be reasonable.  If pain is only temporarily improved or brief, further imaging with an MRI scan may be considered at any time.  We also discussed the option of a viscosupplementation injection which can be considered as well after authorization through insurance.  She would like to proceed with the injection today and will follow-up as needed with recurrent symptoms, questions or concerns.    Risks, benefits, and alternatives to the cortisone injection were discussed including but not limited to needle infection, steroid flare up or failure to improve. The patient would like to proceed and under  meticulous sterile technique 4cc of Xylocaine,  and 40 mg of Kenalog were injected to the right knee via a lateral patellofemoral approach.  A band aid was placed over the injection site and they tolerated the procedure well.  Patient was instructed to follow up with any adverse reactions.            Mercy Dexter PA-C  Orthopedic Surgery   [address] [place] [id number]   t: [phone]  f: [phone]           This document was partially prepared using Dragon Medical voice recognition software.  Although every attempt is made to correct errors during dictation, discrepancies may still exist. Please contact me with any questions or clarifications.

## 2025-02-15 ENCOUNTER — APPOINTMENT (OUTPATIENT)
Dept: GENERAL RADIOLOGY | Age: 70
End: 2025-02-15
Attending: NURSE PRACTITIONER
Payer: MEDICARE

## 2025-02-15 ENCOUNTER — HOSPITAL ENCOUNTER (OUTPATIENT)
Age: 70
Discharge: HOME | End: 2025-02-15
Payer: MEDICARE

## 2025-02-15 VITALS
SYSTOLIC BLOOD PRESSURE: 137 MMHG | DIASTOLIC BLOOD PRESSURE: 66 MMHG | TEMPERATURE: 98 F | OXYGEN SATURATION: 97 % | RESPIRATION RATE: 18 BRPM | HEART RATE: 102 BPM

## 2025-02-15 DIAGNOSIS — M79.675 PAIN OF TOE OF LEFT FOOT: Primary | ICD-10-CM

## 2025-02-15 PROCEDURE — 99213 OFFICE O/P EST LOW 20 MIN: CPT | Performed by: NURSE PRACTITIONER

## 2025-02-15 PROCEDURE — 73630 X-RAY EXAM OF FOOT: CPT | Performed by: NURSE PRACTITIONER

## 2025-02-15 NOTE — ED PROVIDER NOTES
Patient Seen in: [facility]      History     Chief Complaint   Patient presents with    Toe Pain     Stated Complaint: Leg or Foot Injury  Subjective:   HPI    This is a 69-year-old female with a history of bipolar disease, chronic kidney disease, hypertension who presents with left fifth toe redness since last night.  States there was no injury.  She states that a little bit different than her right toe.  Denies any pain.  No fever.    Objective:   Past Medical History:    After cataract not obscuring vision, bilateral    Amblyopia, left    Bilateral dry eyes    Floater, vitreous, bilateral    Wrist fracture, closed    right wrist fracture            Past Surgical History:   Procedure Laterality Date    Cataract extraction w/  intraocular lens implant Bilateral 2015    [facility]    Strabismus surgery Bilateral 1960 and 1962    eye muscle surgery at age 5 and 7 for \"crossed eye\"               Social History     Socioeconomic History    Marital status:    Tobacco Use    Smoking status: Never    Smokeless tobacco: Never   Vaping Use    Vaping status: Never Used   Substance and Sexual Activity    Alcohol use: Not Currently    Drug use: Not Currently     Social Drivers of Health      [facility], [facility]    Housing Stability            Review of Systems   All other systems reviewed and are negative.      Positive for stated complaint: Toe Pain    Other systems are as noted in HPI.  Constitutional and vital signs reviewed.      All other systems reviewed and negative except as noted above.    Physical Exam     ED Triage Vitals [02/15/25 0925]   /66   Pulse 102   Resp 18   Temp 98.1 °F (36.7 °C)   Temp src Oral   SpO2 97 %   O2 Device None (Room air)     Current:/66   Pulse 102   Temp 98.1 °F (36.7 °C) (Oral)   Resp 18   SpO2 97%     Physical Exam  Vitals and nursing note reviewed.   Constitutional:       General: She is  awake. She is not in acute distress.     Appearance: Normal appearance. She is not ill-appearing, toxic-appearing or diaphoretic.   HENT:      Head: Normocephalic and atraumatic.      Right Ear: Tympanic membrane, ear canal and external ear normal.      Left Ear: Tympanic membrane, ear canal and external ear normal.      Nose: Nose normal.      Mouth/Throat:      Mouth: Mucous membranes are moist.      Pharynx: Oropharynx is clear. Uvula midline.   Eyes:      General: Lids are normal.      Extraocular Movements: Extraocular movements intact.      Conjunctiva/sclera: Conjunctivae normal.      Pupils: Pupils are equal, round, and reactive to light.   Cardiovascular:      Rate and Rhythm: Normal rate and regular rhythm.      Pulses: Normal pulses.      Heart sounds: Normal heart sounds.   Pulmonary:      Effort: Pulmonary effort is normal.      Breath sounds: Normal breath sounds.   Feet:      Right foot:      Skin integrity: Skin integrity normal.      Left foot:      Skin integrity: Skin integrity normal.      Comments: There is no swelling or erythema to the toes, no tenderness. Cap refill <3 seconds   Skin:     General: Skin is warm and dry.      Capillary Refill: Capillary refill takes less than 2 seconds.   Neurological:      General: No focal deficit present.      Mental Status: She is alert and oriented to person, place, and time.   Psychiatric:         Mood and Affect: Mood normal.         Behavior: Behavior normal. Behavior is cooperative.         Thought Content: Thought content normal.         Judgment: Judgment normal.         ED Course   Xray and re-evaluate   X-ray reviewed, no acute fracture or dislocation, slight to moderate degenerative changes within the foot.  XR FOOT, COMPLETE (MIN 3 VIEWS), LEFT (CPT=73630)    Result Date: 2/15/2025  CONCLUSION:   1. No acute fracture/dislocation.  2. Slight to moderate degenerative changes within the foot.     Dictated by (CST): Dimitry Quinteros MD on 2/15/2025  at 10:04 AM     Finalized by (CST): Dimitry Quinteros MD on 2/15/2025 at 10:05 AM          XR FOOT, COMPLETE (MIN 3 VIEWS), LEFT (CPT=73630)    Result Date: 2/15/2025  CONCLUSION:   1. No acute fracture/dislocation.  2. Slight to moderate degenerative changes within the foot.     Dictated by (CST): Dimitry Quinteros MD on 2/15/2025 at 10:04 AM     Finalized by (CST): Dimitry Quinteros MD on 2/15/2025 at 10:05 AM         Labs Reviewed - No data to display    MDM     Medical Decision Making  Differential diagnoses reflecting the complexity of care include but are not limited to cellulitis, foot contusion, fracture, gout.    Comorbidities that add complexity to management include: N/A  History obtained by an independent source was from: N/A  Discussions of management was done with: N/A  My independent interpretations of studies include: X-ray foot, personally viewed the images.  No evidence of any acute fracture or dislocation  Shared decision making was done by: Patient and myself  Patient is well appearing, non-toxic and in no acute distress.  Vital signs are stable.   Discussed the x-ray findings with the patient.  The toe is not erythematous, there is no warmth, cap refill less than 3 seconds, I did discuss with her I would follow-up with PCP.  She now mentions that she may have hit the toe yesterday when she was at [employer] trying and slippers.  The extremity is neuro vastly intact at discharge.  Discussed RICE.  Patient verbalized plan of care and stated understanding.    Problems Addressed:  Pain of toe of left foot: acute illness or injury    Amount and/or Complexity of Data Reviewed  Radiology: ordered and independent interpretation performed. Decision-making details documented in ED Course.        Disposition and Plan     Clinical Impression:  1. Pain of toe of left foot         Disposition:  Discharge  2/15/2025 10:07 am    Follow-up:  Brigitte Gardiner MD  [address] [place] [address]  [id number]  [phone]                Medications Prescribed:  Discharge Medication List as of 2/15/2025 10:08 AM             Note to patient: The 21st Century cares act makes medical notes like these available to patients in the interest of transparency.  However, be advised this medical document and is intended as peer to peer communication.  It is read the medical language and may contain abbreviations or verbiage that are unfamiliar.  It may appear blunt or direct.  Medical documents are intended to carry relevant information, fax is evident and the clinical opinion of the practitioner.    This note was prepared using Dragon Medical voice recognition dictation software.  As a result, errors may occur.  When identified, these errors have been corrected.  While every attempt is made to correct errors during dictation, discrepancies may still exist.    Ami Hensley, APRN  2/15/2025  9:59 AM

## 2025-02-15 NOTE — DISCHARGE INSTRUCTIONS
Please follow-up with your primary care provider as needed.  Your x-ray here is normal, no evidence of broken bone.  There is no evidence of infection on exam.

## 2025-02-27 ENCOUNTER — TELEPHONE (OUTPATIENT)
Facility: CLINIC | Age: 70
End: 2025-02-27

## 2025-02-27 DIAGNOSIS — M25.561 RIGHT KNEE PAIN, UNSPECIFIED CHRONICITY: ICD-10-CM

## 2025-02-27 DIAGNOSIS — M17.31 POST-TRAUMATIC OSTEOARTHRITIS OF RIGHT KNEE: Primary | ICD-10-CM

## 2025-02-27 NOTE — TELEPHONE ENCOUNTER
Do you want to order MRI and or visco?   - Call received with patient asking for next step    LOV: 2/3/25  Per LOV: \"If pain is only temporarily improved or brief, further imaging with an MRI scan may be considered at any time. We also discussed the option of a viscosupplementation injection which can be considered as well after authorization through insurance.\"

## 2025-02-27 NOTE — TELEPHONE ENCOUNTER
Patient called stating she does not feel like the cortisone injection helped her. Patient would like to know what the next steps are. Please advise.

## 2025-02-28 ENCOUNTER — HOSPITAL ENCOUNTER (OUTPATIENT)
Dept: MRI IMAGING | Age: 70
Discharge: HOME | End: 2025-02-28
Attending: PHYSICIAN ASSISTANT
Payer: MEDICARE

## 2025-02-28 DIAGNOSIS — M25.561 RIGHT KNEE PAIN, UNSPECIFIED CHRONICITY: ICD-10-CM

## 2025-02-28 DIAGNOSIS — M17.31 POST-TRAUMATIC OSTEOARTHRITIS OF RIGHT KNEE: ICD-10-CM

## 2025-02-28 PROCEDURE — 73721 MRI JNT OF LWR EXTRE W/O DYE: CPT | Performed by: PHYSICIAN ASSISTANT

## 2025-02-28 NOTE — TELEPHONE ENCOUNTER
[facility] PA-C to [facility] Clinical Pool        2/27/25  2:52 PM  Lets have her check an MRI, order placed thank you

## 2025-02-28 NOTE — TELEPHONE ENCOUNTER
Spoke with patient to let her know [facility] put in a MRI order for her right knee.  Gave the patient the phone numbers for [facility] and [facility].  Cancelled upcoming appointment with Dr Page and advised patient to make an appointment  2 days after her MRI appointment with Dr Page to review the MRI result.  Patient verbalized understanding.

## 2025-03-10 ENCOUNTER — OFFICE VISIT (OUTPATIENT)
Dept: ORTHOPEDICS CLINIC | Facility: CLINIC | Age: 70
End: 2025-03-10
Payer: MEDICARE

## 2025-03-10 VITALS — BODY MASS INDEX: 30.82 KG/M2 | WEIGHT: 185 LBS | HEIGHT: 65 IN

## 2025-03-10 DIAGNOSIS — S72.491G: ICD-10-CM

## 2025-03-10 DIAGNOSIS — M17.31 POST-TRAUMATIC OSTEOARTHRITIS OF RIGHT KNEE: Primary | ICD-10-CM

## 2025-03-10 PROCEDURE — 99214 OFFICE O/P EST MOD 30 MIN: CPT | Performed by: ORTHOPAEDIC SURGERY

## 2025-03-10 NOTE — PROGRESS NOTES
[facility] Note      Chief Complaint   Patient presents with    Follow - Up     RIGHT KNEE MRI RESULTS         HPI: The patient is a 69 year old female referred for orthopaedic consultation by Dr. Dano Zavala with complaints of chronic right knee pain.  Pain is localized anteriorly and medially with prolonged standing and walking.  She is now using a cane as an assistive device due to a history of frequent falling perhaps related to her deteriorating vision and balance issues.  Prolonged sitting results in pain and stiffness which tends to improve as she is up and walking.  She denies catching, locking, instability, warmth or swelling.  She is limited from anti-inflammatory use and depends on Tylenol which is minimally beneficial.    Past Medical History:    After cataract not obscuring vision, bilateral    Amblyopia, left    Bilateral dry eyes    Floater, vitreous, bilateral    Wrist fracture, closed    right wrist fracture     Past Surgical History:   Procedure Laterality Date    Cataract extraction w/  intraocular lens implant Bilateral 2015    [facility]    Strabismus surgery Bilateral 1960 and 1962    eye muscle surgery at age 5 and 7 for \"crossed eye\"      Current Outpatient Medications   Medication Sig Dispense Refill    amLODIPine Besy-Benazepril HCl 10-20 MG Oral Cap Take 1 capsule by mouth daily. 90 capsule 3    montelukast 10 MG Oral Tab Take 1 tablet (10 mg total) by mouth nightly. 90 tablet 3    Fluticasone Propionate 50 MCG/ACT Nasal Suspension by Each Nare route daily.      buPROPion HCl ER, XL, 150 MG Oral Tablet 24 Hr Take 1 tablet (150 mg total) by mouth daily.      cetirizine 10 MG Oral Tab Take 1 tablet (10 mg total) by mouth daily.      OXcarbazepine 150 MG Oral Tab Take 1 tablet (150 mg total) by mouth 2 (two) times daily.       Allergies   Allergen Reactions    Seasonal UNKNOWN     Family History   Problem Relation Age of Onset    Diabetes Neg     Glaucoma Neg      Macular degeneration Neg      Social History     Occupational History    Not on file   Tobacco Use    Smoking status: Never    Smokeless tobacco: Never   Vaping Use    Vaping status: Never Used   Substance and Sexual Activity    Alcohol use: Not Currently    Drug use: Not Currently    Sexual activity: Not on file        ROS:  Complete ROS reviewed by me and non-contributory to the chief complaint except as mentioned above.    Physical Exam:    Ht 5' 5\" (1.651 m)   Wt 185 lb (83.9 kg)   BMI 30.79 kg/m²   Right knee:  Inspection reveals no significant warmth or effusion.  Palpation reveals significant effusion.  Range of motion is adequate extension and flexion to at least 125 degrees.  Medial joint line is tender to palpation.  Collaterals are stable on varus valgus stress.  Lachman and posterior drawer are negative.  Popliteal space is nontender.  No significant distal edema is noted.  Neurovascular status is intact on sensory, motor and perfusion assessment distally.      Imaging:      MRI KNEE, RIGHT (NLB=45913)    Result Date: 3/6/2025  CONCLUSION:   1. Large osteochondral defect in the medial femoral condyle measuring 1.3 x 2.0 cm.  2. Questionable tearing at the posterior root attachment of the medial meniscus.  Mild intrasubstance degeneration of the body of the medial meniscus and mild fraying of the free edge of the meniscus at the junction of the body and posterior horn.  3. Mild focal intrasubstance degeneration near the free edge of the lateral meniscus at the body.  4. The cruciate and collateral ligaments are intact.  5. Small joint effusion.  Small Baker's cyst.  6. Subchondral edema in the tibial spine of uncertain clinical significance.     Dictated by (CST): Kaden Serrano MD on 3/06/2025 at 12:30 PM     Finalized by (CST): Kaden Serrano MD on 3/06/2025 at 12:47 PM             Assessment/Diagnoses:  Diagnoses and all orders for this visit:    Post-traumatic osteoarthritis of right knee  -      DME - External       Plan:  I reviewed imaging and exam findings with the patient and her sister.  MRI shows a fairly significant osteochondral lesion at the distal femoral condyle with underlying subchondral edema.  Questionable subtle meniscal pathology is also described.  We discussed treatment options moving forward including continued conservative care versus surgical intervention.  Unfortunately her osteochondral lesion is quite large and I do not believe she would be a candidate for microfracture or OATS procedure.  If symptoms do not respond to ongoing conservative care, she may become a candidate for unicondylar arthroplasty.  We touched upon the nature of this procedure and she is not interested in proceeding with surgery at this point.  I therefore advised that rather than a cane, she would benefit from a period of protected weightbearing with a rolling walker where she may support the weight of the leg itself but prevent body weight shifting onto this right side.  Symptoms should improve over the course of 2 to 4 weeks.  They inquired about physical therapy and this would be beneficial for gait education.  They will contact our office if a formal prescription is desired.  Follow-up assessment is recommended in 4 to 6 weeks at which point we will hopefully see some symptomatic relief.  Her last corticosteroid injection was not beneficial and I am not convinced that any repeat injections would provide relief.  All questions were answered and she verbalized understanding and appreciation.  Follow-up sooner if she has new symptoms or concerns.      Holly Page MD, [facility]  Orthopaedic Surgery   Sports Medicine/Knee and Shoulder  [facility]  t: [phone]  f: [phone]           This document was partially prepared using Dragon Medical voice recognition software.  Although every attempt is made to correct errors during dictation, discrepancies may still  exist.

## 2025-03-14 ENCOUNTER — TELEPHONE (OUTPATIENT)
Facility: CLINIC | Age: 70
End: 2025-03-14

## 2025-03-14 NOTE — TELEPHONE ENCOUNTER
Patient had a really hard fall 2 years ago on the right knee. The pain has just been getting worse.     Patient wants to let the nurse know if this could be a connection from the bruise.    Patient said if you have any questions please call her.

## 2025-03-14 NOTE — TELEPHONE ENCOUNTER
1st attempt to reach the patient. No answer at this time.  Message left to call the office back with any questions.     - patient called to mention the previous fall 2 years ago.  No follow up need at this time.

## 2025-03-24 ENCOUNTER — OFFICE VISIT (OUTPATIENT)
Dept: FAMILY MEDICINE CLINIC | Facility: CLINIC | Age: 70
End: 2025-03-24

## 2025-03-24 ENCOUNTER — LAB ENCOUNTER (OUTPATIENT)
Dept: LAB | Age: 70
End: 2025-03-24
Attending: FAMILY MEDICINE
Payer: MEDICARE

## 2025-03-24 VITALS
OXYGEN SATURATION: 98 % | RESPIRATION RATE: 18 BRPM | HEIGHT: 65 IN | TEMPERATURE: 98 F | HEART RATE: 83 BPM | SYSTOLIC BLOOD PRESSURE: 135 MMHG | BODY MASS INDEX: 31.16 KG/M2 | DIASTOLIC BLOOD PRESSURE: 78 MMHG | WEIGHT: 187 LBS

## 2025-03-24 DIAGNOSIS — M25.569 PAIN OF KNEE JOINT WITH OSTEOCHONDRAL INJURY: ICD-10-CM

## 2025-03-24 DIAGNOSIS — E78.5 HYPERLIPIDEMIA, UNSPECIFIED HYPERLIPIDEMIA TYPE: ICD-10-CM

## 2025-03-24 DIAGNOSIS — I10 ESSENTIAL HYPERTENSION: Primary | ICD-10-CM

## 2025-03-24 DIAGNOSIS — I10 ESSENTIAL HYPERTENSION: ICD-10-CM

## 2025-03-24 LAB
ALBUMIN SERPL-MCNC: 4.3 G/DL (ref 3.2–4.8)
ALBUMIN/GLOB SERPL: 1.8 {RATIO} (ref 1–2)
ALP LIVER SERPL-CCNC: 86 U/L
ALT SERPL-CCNC: 22 U/L
ANION GAP SERPL CALC-SCNC: 6 MMOL/L (ref 0–18)
AST SERPL-CCNC: 25 U/L (ref ?–34)
BILIRUB SERPL-MCNC: 0.5 MG/DL (ref 0.2–1.1)
BUN BLD-MCNC: 26 MG/DL (ref 9–23)
BUN/CREAT SERPL: 21.3 (ref 10–20)
CALCIUM BLD-MCNC: 9.7 MG/DL (ref 8.7–10.4)
CHLORIDE SERPL-SCNC: 105 MMOL/L (ref 98–112)
CHOLEST SERPL-MCNC: 230 MG/DL (ref ?–200)
CO2 SERPL-SCNC: 29 MMOL/L (ref 21–32)
CREAT BLD-MCNC: 1.22 MG/DL
EGFRCR SERPLBLD CKD-EPI 2021: 49 ML/MIN/1.73M2 (ref 60–?)
FASTING PATIENT LIPID ANSWER: NO
FASTING STATUS PATIENT QL REPORTED: NO
GLOBULIN PLAS-MCNC: 2.4 G/DL (ref 2–3.5)
GLUCOSE BLD-MCNC: 78 MG/DL (ref 70–99)
HDLC SERPL-MCNC: 81 MG/DL (ref 40–59)
LDLC SERPL CALC-MCNC: 130 MG/DL (ref ?–100)
NONHDLC SERPL-MCNC: 149 MG/DL (ref ?–130)
OSMOLALITY SERPL CALC.SUM OF ELEC: 294 MOSM/KG (ref 275–295)
POTASSIUM SERPL-SCNC: 4.5 MMOL/L (ref 3.5–5.1)
PROT SERPL-MCNC: 6.7 G/DL (ref 5.7–8.2)
SODIUM SERPL-SCNC: 140 MMOL/L (ref 136–145)
TRIGL SERPL-MCNC: 109 MG/DL (ref 30–149)
VLDLC SERPL CALC-MCNC: 20 MG/DL (ref 0–30)

## 2025-03-24 PROCEDURE — 36415 COLL VENOUS BLD VENIPUNCTURE: CPT

## 2025-03-24 PROCEDURE — 99214 OFFICE O/P EST MOD 30 MIN: CPT | Performed by: FAMILY MEDICINE

## 2025-03-24 PROCEDURE — 80061 LIPID PANEL: CPT

## 2025-03-24 PROCEDURE — 80053 COMPREHEN METABOLIC PANEL: CPT

## 2025-03-24 NOTE — PROGRESS NOTES
Subjective:   Stephanie Cavanaugh is a 69 year old female who presents for Follow - Up (Pt here for f/u blood pressure and cholesterol recheck.)       History/Other:   History of Present Illness  The patient, with a history of mild arthritis, presents with worsening knee pain. She reports a history of frequent falls on the affected knee. Despite a cortisone shot in September that initially provided relief, a second shot did not yield the same results. An MRI revealed a bone bruise, which the patient believes she has had for a couple of years due to recurrent falls. The patient has been advised to rest for a month, but reports difficulty adhering to this advice due to her active lifestyle. She notes that her knee pain has worsened over the two weeks of rest, which she finds puzzling as walking seemed to alleviate the pain previously.    In addition to her knee pain, the patient has been managing her cholesterol levels through dietary changes, including reducing her intake of cheese and red meat and increasing her consumption of peanut butter sandwiches. Despite these efforts, she is aware that her cholesterol levels may still be high due to genetic factors. She expresses a desire to avoid additional medication if possible, but understands the importance of managing her cholesterol to reduce her risk of strokes and heart attacks, which run in her family.    The patient also mentions a recent diagnostic mammogram, which thankfully revealed no issues. She has also completed a Cologuard test for colon cancer which was neg 11/2023.      Chief Complaint Reviewed and Verified  Nursing Notes Reviewed and   Verified  Tobacco Reviewed  Allergies Reviewed  Medications Reviewed    Problem List Reviewed  OB Status Reviewed       Tobacco:  She has never smoked tobacco.    Current Outpatient Medications   Medication Sig Dispense Refill    amLODIPine Besy-Benazepril HCl 10-20 MG Oral Cap Take 1 capsule by mouth daily. 90 capsule 3     montelukast 10 MG Oral Tab Take 1 tablet (10 mg total) by mouth nightly. 90 tablet 3    Fluticasone Propionate 50 MCG/ACT Nasal Suspension by Each Nare route daily.      buPROPion HCl ER, XL, 150 MG Oral Tablet 24 Hr Take 1 tablet (150 mg total) by mouth daily.      cetirizine 10 MG Oral Tab Take 1 tablet (10 mg total) by mouth daily.      OXcarbazepine 150 MG Oral Tab Take 1 tablet (150 mg total) by mouth 2 (two) times daily.                Review of Systems:  See above      Objective:   /78   Pulse 83   Temp 97.5 °F (36.4 °C) (Temporal)   Resp 18   Ht 5' 5\" (1.651 m)   Wt 187 lb (84.8 kg)   SpO2 98%   BMI 31.12 kg/m²    Estimated body mass index is 31.12 kg/m² as calculated from the following:    Height as of this encounter: 5' 5\" (1.651 m).    Weight as of this encounter: 187 lb (84.8 kg).  Results  RADIOLOGY  MRI KNEE, RIGHT (HEY=13799)    Result Date: 3/6/2025  PROCEDURE: MRI KNEE, RIGHT (QTR=44124)  COMPARISON: [facility], XR KNEE (3 VIEWS), LEFT (CPT=73562), 10/07/2024, 12:42 PM.  INDICATIONS: M25.561 Right knee pain, unspecified chronicity M17.31 Post-traumatic osteoarthritis of right knee  TECHNIQUE: A complete multi-planar MRI was performed.   FINDINGS:  MENISCI: MEDIAL MENISCUS: High signal at the posterior root attachment of the medial meniscus series 4, image 17 may indicate a tear in the right clinical scenario.  Mild fraying of the free edge of the medial meniscus at the junction of body and posterior horn series 6, image 8. Mild intrasubstance degeneration of the body of the medial meniscus series 4, image 15. LATERAL MENISCUS: There is a small focus of moderate intrasubstance signal abnormality in the body of the lateral meniscus series 4, image 14 near the free edge.  LIGAMENTS: ACL: Normal appearing ligament.  PCL: Normal appearing ligament.  LCL COMPLEX: Normal lateral collateral ligament, fascicles, lateral capsule and ligaments.  MCL  COMPLEX: Normal medial collateral ligament and medial capsule.   CARTILAGE:  PATELLOFEMORAL: Mild chondral thinning and heterogeneity without full thickness chondral loss. MEDIAL COMPARTMENT: Large osteochondral defect in the weight-bearing portion of the medial femoral condyle measures 1.3 x 2.0 cm (series 4, image 13; series 6, image 9).  LATERAL COMPARTMENT: Mild chondral heterogeneity and thinning.  EXTENSOR MECHANISM: No acute abnormality.  SYNOVIAL SPACE: Small joint effusion. No intra-articular fragments.  BONES: Osteochondral defect in the medial femoral condyle as above.  Subchondral edema in the tibial spine.  OTHER: The neurovascular bundles are intact.  Small Baker's cyst measuring 1.3 x 0.2 x 0.6 cm.           CONCLUSION:   1. Large osteochondral defect in the medial femoral condyle measuring 1.3 x 2.0 cm.  2. Questionable tearing at the posterior root attachment of the medial meniscus.  Mild intrasubstance degeneration of the body of the medial meniscus and mild fraying of the free edge of the meniscus at the junction of the body and posterior horn.  3. Mild focal intrasubstance degeneration near the free edge of the lateral meniscus at the body.  4. The cruciate and collateral ligaments are intact.  5. Small joint effusion.  Small Baker's cyst.  6. Subchondral edema in the tibial spine of uncertain clinical significance.     Dictated by (CST): Kaden Serrano MD on 3/06/2025 at 12:30 PM     Finalized by (CST): Kaden Serrano MD on 3/06/2025 at 12:47 PM               Physical Exam  Physical Exam  GENERAL: Walking with a cane.  CHEST: Lungs clear to auscultation.  CARDIOVASCULAR: Regular rate and rhythm.  EXTREMITIES: Right trace edema, non-pitting. Left 1+ edema, non-pitting.  MUSCULOSKELETAL: Antalgic gait with cane.      Assessment & Plan:   1. Essential hypertension (Primary)  -     Lipid Panel; Future; Expected date: 03/24/2025  -     Comp Metabolic Panel (14); Future; Expected date: 03/24/2025  2.  Hyperlipidemia, unspecified hyperlipidemia type  -     Lipid Panel; Future; Expected date: 03/24/2025  -     Comp Metabolic Panel (14); Future; Expected date: 03/24/2025  3. Pain of knee joint with osteochondral injury      Assessment & Plan  Assessment & Plan  Knee Pain with Bone Bruise  Chronic knee pain with mild arthritis and bone bruise on MRI. Previous cortisone injection provided temporary relief. Increased pain with rest and mobility issues, especially on stairs. Surgery considered for definitive treatment.  - Continue rest as advised by orthopedics.  - Consider surgical consultation if pain persists.  - Use walker as needed for mobility.  - Discuss potential for swimming as an alternative exercise with orthopedics.    Venous Insufficiency  Venous insufficiency in left foot. Compression stockings advised to reduce edema and improve comfort. Reports discomfort with stockings, especially in summer.  - Wear compression stockings daily from morning until bedtime.    Hyperlipidemia  Elevated cholesterol with dietary modifications attempted. Family history of hyperlipidemia and cardiovascular events. Discussed potential benefits of cholesterol medication if levels remain high. Risk assessment will guide medication decision if >10% risk of cardiovascular event in 10 years.  - Draw blood to check cholesterol levels.  - Consider starting cholesterol medication if risk assessment indicates >10% risk of cardiovascular event in the next 10 years.    [facility] Maintenance  Cologuard test reportedly completed, results not in chart. Normal diagnostic mammogram in December 2024.  - Investigate Cologuard test results with the company.  - Schedule routine physical in six months.            Return in about 6 months (around 9/24/2025) for Routine physical.      Brigitte Gardiner MD

## 2025-03-27 ENCOUNTER — TELEPHONE (OUTPATIENT)
Dept: FAMILY MEDICINE CLINIC | Facility: CLINIC | Age: 70
End: 2025-03-27

## 2025-03-27 RX ORDER — ROSUVASTATIN CALCIUM 5 MG/1
5 TABLET, COATED ORAL NIGHTLY
Qty: 90 TABLET | Refills: 3 | Status: SHIPPED | OUTPATIENT
Start: 2025-03-27

## 2025-03-27 NOTE — TELEPHONE ENCOUNTER
Patient calling to inquire on lab results from 03/24/25.  Patient in agreement to begin cholesterol medication at this time. Preferred pharm [facility]. Please send rx.      Your cholesterol is actually significantly better than last check.  Okay to start cholesterol medicine at this time but definitely keep up heart healthy diet.   Written by Brigitte Gardiner MD on 3/25/2025  7:56 AM CDT    Your blood sugar and liver function tests are normal.  There is a slight decline in your kidney function tests.  In order to preserve kidney function it is important to continue to control blood pressure, generally stay well-hydrated, and avoid medications such as Advil/ibuprofen and Aleve.  We will recheck this in the future.   Written by Brigitte Gardiner MD on 3/25/2025  7:57 AM CDT

## 2025-03-27 NOTE — TELEPHONE ENCOUNTER
Please let her know Rx sent to pharmacy.  It is best taken at bedtime.  Side effects unlikely but call if severe muscle pain.

## 2025-03-28 NOTE — TELEPHONE ENCOUNTER
Patient returning call (name and  of patient verified). Dr. Gardiner's message reviewed. Patient verbalizes understanding; denies further questions and agrees with plan of care.

## 2025-04-03 ENCOUNTER — APPOINTMENT (OUTPATIENT)
Age: 70
End: 2025-04-03

## 2025-04-03 DIAGNOSIS — F31.63 BIPOLAR 1 DISORDER, MIXED, SEVERE  (CMD): Primary | ICD-10-CM

## 2025-04-03 DIAGNOSIS — E66.9 OBESITY (BMI 30-39.9): ICD-10-CM

## 2025-04-03 DIAGNOSIS — F33.41 RECURRENT MAJOR DEPRESSIVE DISORDER, IN PARTIAL REMISSION (CMD): ICD-10-CM

## 2025-04-03 DIAGNOSIS — F41.1 GENERALIZED ANXIETY DISORDER: ICD-10-CM

## 2025-04-03 RX ORDER — OXCARBAZEPINE 150 MG/1
150 TABLET, FILM COATED ORAL 2 TIMES DAILY
Qty: 180 TABLET | Refills: 0 | Status: SHIPPED | OUTPATIENT
Start: 2025-04-03

## 2025-04-03 RX ORDER — BUPROPION HYDROCHLORIDE 150 MG/1
150 TABLET ORAL DAILY
Qty: 90 TABLET | Refills: 0 | Status: SHIPPED | OUTPATIENT
Start: 2025-04-03

## 2025-04-04 ENCOUNTER — TELEPHONE (OUTPATIENT)
Dept: FAMILY MEDICINE CLINIC | Facility: CLINIC | Age: 70
End: 2025-04-04

## 2025-04-04 NOTE — TELEPHONE ENCOUNTER
Patient calling to inquire about if she can take cholesterol medication at night along with her other medications. Patient advised that it is appropriate. Patient verbalized understanding. No further questions at this time.

## 2025-05-20 RX ORDER — MONTELUKAST SODIUM 10 MG/1
10 TABLET ORAL NIGHTLY
Qty: 90 TABLET | Refills: 3 | Status: SHIPPED | OUTPATIENT
Start: 2025-05-20

## 2025-05-20 NOTE — TELEPHONE ENCOUNTER
Please review. Protocol Failed; No Protocol    Future Appointments   Date Time Provider Department Center   9/4/2025 11:00 AM [facility]

## 2025-05-29 ENCOUNTER — TELEPHONE (OUTPATIENT)
Dept: ORTHOPEDICS CLINIC | Facility: CLINIC | Age: 70
End: 2025-05-29

## 2025-05-29 NOTE — TELEPHONE ENCOUNTER
Most recent fall 2 weeks ago tripped and landed on her knees.  Patient is wondering if she can start using the Voltaren gel again with the bone bruise right knee.  She said she read on the internet it was not good to use Voltaren gel  with a bone bruise.  She can not take ibuprofen and tylenol doesn't really do much for the pain.This writer advised that is should be fine, but will confirm with IVAN Lozano.  Will notify patient if [facility] says otherwise.    Future Appointments   Date Time Provider Department Center   6/9/2025 10:20 AM Holly Page MD [facility]   9/4/2025 11:00 AM [facility]

## 2025-06-05 RX ORDER — AMLODIPINE AND BENAZEPRIL HYDROCHLORIDE 10; 20 MG/1; MG/1
1 CAPSULE ORAL DAILY
Qty: 90 CAPSULE | Refills: 3 | Status: SHIPPED | OUTPATIENT
Start: 2025-06-05

## 2025-06-05 NOTE — TELEPHONE ENCOUNTER
Please review.  Protocol failed/has no protocol.    Last Office Visit: 03/24/2025  Please advise if refill is appropriate.  Requested Prescriptions     Pending Prescriptions Disp Refills    AMLODIPINE BESY-BENAZEPRIL HCL 10-20 MG Oral Cap [Pharmacy Med Name: AMLODIPINE-BENAZ 10/20MG CAPSULES] 90 capsule 3     Sig: TAKE 1 CAPSULE BY MOUTH DAILY

## 2025-06-09 ENCOUNTER — OFFICE VISIT (OUTPATIENT)
Dept: ORTHOPEDICS CLINIC | Facility: CLINIC | Age: 70
End: 2025-06-09
Payer: MEDICARE

## 2025-06-09 VITALS — HEIGHT: 65 IN | BODY MASS INDEX: 31.16 KG/M2 | WEIGHT: 187 LBS

## 2025-06-09 DIAGNOSIS — S72.491G: ICD-10-CM

## 2025-06-09 DIAGNOSIS — M17.11 PRIMARY OSTEOARTHRITIS OF RIGHT KNEE: Primary | ICD-10-CM

## 2025-06-09 DIAGNOSIS — M17.31 POST-TRAUMATIC OSTEOARTHRITIS OF RIGHT KNEE: ICD-10-CM

## 2025-06-09 RX ORDER — TRIAMCINOLONE ACETONIDE 40 MG/ML
40 INJECTION, SUSPENSION INTRA-ARTICULAR; INTRAMUSCULAR ONCE
Status: COMPLETED | OUTPATIENT
Start: 2025-06-09 | End: 2025-06-09

## 2025-06-09 RX ADMIN — TRIAMCINOLONE ACETONIDE 40 MG: 40 INJECTION, SUSPENSION INTRA-ARTICULAR; INTRAMUSCULAR at 11:02:00

## 2025-06-09 NOTE — PROGRESS NOTES
[facility] Orthopaedic New Consult         The following individual(s) verbally consented to be recorded using ambient AI listening technology and understand that they can each withdraw their consent to this listening technology at any point by asking the clinician to turn off or pause the recording:    Patient name: Stephanie Cavanaugh  Additional names: Barbara Carrasco, sister        Chief Complaint   Patient presents with    Follow - Up     RIGHT KNEE  -Had a fall a few weeks ago     History of Present Illness  Stephanie Cavanaugh is a 70 year old female who presents with knee pain and stiffness. She is accompanied by her sister, Barbara Carrasco.    She experiences knee pain and stiffness, particularly in the morning, with the pain primarily located on the medial side of the knee. The pain is currently rated as 2 out of 10 and improves with walking and applying pressure on her foot. She has a history of receiving cortisone injections, with the first injection in November providing significant relief, but a subsequent injection in February was ineffective. Since March, she has been able to go up and down stairs without pain.    She has difficulty with balance due to limited peripheral vision, which affects her walking. She has used a walker in the past and recently purchased walking canes to aid with balance. She wants to avoid using a walker regularly. No recent falls, but she is concerned about balance due to limited peripheral vision.    She engages in physical activities such as walking and using an exercise bike. Walking helps loosen her knee, but excessive walking leads to swelling and increased pain. She used to walk 10,000 steps a day but has reduced her activity due to knee pain. She cannot take ibuprofen due to kidney concerns. She has a history of swimming but currently lacks access to a pool. She uses an exercise bike for low-impact exercise, which she finds beneficial for her knee.    Past Medical  History[1]  Past Surgical History[2]  Current Medications[3]  Allergies[4]  Family History[5]  Social History     Occupational History    Not on file   Tobacco Use    Smoking status: Never    Smokeless tobacco: Never   Vaping Use    Vaping status: Never Used   Substance and Sexual Activity    Alcohol use: Not Currently    Drug use: Not Currently    Sexual activity: Not on file        ROS:  Complete ROS reviewed by me and non-contributory to the chief complaint except as mentioned above.    Physical Exam:    Ht 5' 5\" (1.651 m)   Wt 187 lb (84.8 kg)   BMI 31.12 kg/m²   Constitutional: Well developed, well nourished 70 year old female  Psychological: NAD, alert and appropriate  Respiratory: Breathing comfortably on room air with RR of 10-14  Cardiac: Palpable distal pulses with pink warm extremities distally  Right knee: Inspection reveals no significant discoloration nor deformity.  Palpation reveals no effusion.  Range of motion is adequate with full extension and adequate flexion to at least 115 degrees.  Medial joint line is tender to palpation.  Collaterals are stable on varus valgus stress.  Lachman and posterior drawer are negative.  Popliteal space is nontender.  No significant distal edema is noted.  Neurovascular status is intact on sensory, motor and perfusion assessment distally.    Imaging: Previous MRI identified a 1 x 2 cm osteochondral defect with underlying bone marrow edema in the medial compartment.    MRI KNEE, RIGHT (ONL=[id number])     Result Date: 3/6/2025  CONCLUSION:          1. Large osteochondral defect in the medial femoral condyle measuring 1.3 x 2.0 cm.  2. Questionable tearing at the posterior root attachment of the medial meniscus.  Mild intrasubstance degeneration of the body of the medial meniscus and mild fraying of the free edge of the meniscus at the junction of the body and posterior horn.  3. Mild focal intrasubstance degeneration near the free edge of the lateral meniscus at the  body.  4. The cruciate and collateral ligaments are intact.  5. Small joint effusion.  Small Baker's cyst.  6. Subchondral edema in the tibial spine of uncertain clinical significance.     Dictated by (CST): Kaden Serrano MD on 3/06/2025 at 12:30 PM     Finalized by (CST): Kaden Serrano MD on 3/06/2025 at 12:47 PM         Assessment/Diagnoses:    Diagnoses and all orders for this visit:    Primary osteoarthritis of right knee  -     DRAIN/INJECT LARGE JOINT/BURSA  -     triamcinolone acetonide (Kenalog-40) 40 MG/ML injection 40 mg    Post-traumatic osteoarthritis of right knee    Closed osteochondral fracture of distal end of right femur with delayed healing, subsequent encounter      Assessment & Plan  Osteochondral lesion medial femoral condyle with early osteoarthritis and chronic pain  Chronic post-traumatic osteoarthritis of the right knee with significant cartilage loss on the medial side, causing pain and stiffness. Pain varies with activity, currently rated at 2/10. Previous cortisone injection in February was effective, but knee function improved since March. MRI shows significant cartilage loss; x-rays show a weak spot but not severe arthritis. Conservative management is ongoing, with potential for partial knee replacement if pain escalates. Emphasized fall prevention due to balance issues and peripheral vision loss. Cortisone injection is a temporizing anti-inflammatory measure, not guaranteed to relieve pain. Gel injections discussed as an alternative, pending insurance approval. Partial knee replacement may provide relief for up to ten years, with possible future conversion to total knee replacement.  - Administer cortisone injection today to reduce pain.  - Encourage use of a cane or walker to prevent falls.  - Advise on low-impact exercises such as cycling and swimming to maintain joint mobility without excessive load.  - Provide brochure on gel injections for future consideration, pending insurance  approval.  - Discuss potential referral to Dr. Redmond or [place] for partial knee replacement if pain becomes unmanageable.      Knee Cortisone Injection Procedure:  After discussing risks, benefits and alternatives to cortisone injection including but not limited to needle infection, steroid flare or failed improvement, the patient gave written and verbal consent to proceed.  Using meticulous sterile technique, I injected 40 mg of Kenalog mixed with 4 cc of 1% Xylocaine at the lateral patellofemoral joint of the right knee to minimal resistance.  The patient tolerated this well and a Band-Aid was applied.  Instructions were given to contact us if the patient experiences any adverse effects.    Holly Page MD, [facility]  Orthopaedic Surgery   Sports Medicine/Knee [facility]  t: [phone]  f: [phone]           This document was partially prepared using Dragon Medical voice recognition software.  Although every attempt is made to correct errors during dictation, discrepancies may still exist.          [1]   Past Medical History:   After cataract not obscuring vision, bilateral    Amblyopia, left    Bilateral dry eyes    Floater, vitreous, bilateral    Wrist fracture, closed    right wrist fracture   [2]   Past Surgical History:  Procedure Laterality Date    Cataract extraction w/  intraocular lens implant Bilateral 2015    [facility]    Strabismus surgery Bilateral 1960 and 1962    eye muscle surgery at age 5 and 7 for \"crossed eye\"    [3]   Current Outpatient Medications   Medication Sig Dispense Refill    AMLODIPINE BESY-BENAZEPRIL HCL 10-20 MG Oral Cap TAKE 1 CAPSULE BY MOUTH DAILY 90 capsule 3    montelukast 10 MG Oral Tab Take 1 tablet (10 mg total) by mouth nightly. 90 tablet 3    rosuvastatin 5 MG Oral Tab Take 1 tablet (5 mg total) by mouth nightly. 90 tablet 3    Fluticasone Propionate 50 MCG/ACT Nasal Suspension by Each Nare route daily.       buPROPion HCl ER, XL, 150 MG Oral Tablet 24 Hr Take 1 tablet (150 mg total) by mouth daily.      cetirizine 10 MG Oral Tab Take 1 tablet (10 mg total) by mouth daily.      OXcarbazepine 150 MG Oral Tab Take 1 tablet (150 mg total) by mouth 2 (two) times daily.     [4]   Allergies  Allergen Reactions    Seasonal UNKNOWN   [5]   Family History  Problem Relation Age of Onset    Diabetes Neg     Glaucoma Neg     Macular degeneration Neg

## 2025-06-30 RX ORDER — OXCARBAZEPINE 150 MG/1
150 TABLET, FILM COATED ORAL 2 TIMES DAILY
Qty: 60 TABLET | Refills: 0 | Status: SHIPPED | OUTPATIENT
Start: 2025-06-30

## 2025-07-02 RX ORDER — BUPROPION HYDROCHLORIDE 150 MG/1
150 TABLET ORAL DAILY
Qty: 30 TABLET | Refills: 0 | Status: SHIPPED | OUTPATIENT
Start: 2025-07-02

## 2025-08-25 ENCOUNTER — TELEPHONE (OUTPATIENT)
Dept: ORTHOPEDICS CLINIC | Facility: CLINIC | Age: 70
End: 2025-08-25

## 2025-08-25 ENCOUNTER — APPOINTMENT (OUTPATIENT)
Age: 70
End: 2025-08-25

## 2025-08-25 DIAGNOSIS — F33.41 RECURRENT MAJOR DEPRESSIVE DISORDER, IN PARTIAL REMISSION (CMD): ICD-10-CM

## 2025-08-25 DIAGNOSIS — F41.1 GENERALIZED ANXIETY DISORDER: ICD-10-CM

## 2025-08-25 DIAGNOSIS — E66.9 OBESITY (BMI 30-39.9): ICD-10-CM

## 2025-08-25 DIAGNOSIS — M17.31 POST-TRAUMATIC OSTEOARTHRITIS OF RIGHT KNEE: ICD-10-CM

## 2025-08-25 DIAGNOSIS — M25.561 RIGHT KNEE PAIN, UNSPECIFIED CHRONICITY: ICD-10-CM

## 2025-08-25 DIAGNOSIS — F31.63 BIPOLAR 1 DISORDER, MIXED, SEVERE  (CMD): Primary | ICD-10-CM

## 2025-08-25 DIAGNOSIS — M17.11 PRIMARY OSTEOARTHRITIS OF RIGHT KNEE: Primary | ICD-10-CM

## 2025-08-25 RX ORDER — OXCARBAZEPINE 150 MG/1
150 TABLET, FILM COATED ORAL 2 TIMES DAILY
Qty: 180 TABLET | Refills: 0 | Status: SHIPPED | OUTPATIENT
Start: 2025-08-25

## 2025-08-25 RX ORDER — BUPROPION HYDROCHLORIDE 150 MG/1
150 TABLET ORAL DAILY
Qty: 90 TABLET | Refills: 0 | Status: SHIPPED | OUTPATIENT
Start: 2025-08-25

## (undated) NOTE — LETTER
3/28/2025        Stephanie Cavanaugh  [address]  [facility] [address]         Dear Stephanie,    This letter is to inform you that our office has made several attempts to reach you by phone without success.  We were attempting to contact you by phone regarding lab results    I have sent over a new prescription to your pharmacy. Please take this medication at bedtime. It is unlikely that you will have side effects but please call if you experience any muscle pain.     Please contact our office at the number listed below if you have any questions.  Thank you for your cooperation.        Sincerely,    Brigitte Gardiner MD  [address] [place] [id number]  Ph: [phone]  Fax: [phone]

## (undated) NOTE — LETTER
2/27/2020          To Whom It May Concern:    Sanam Cuellar is currently under my medical care and and I recommend she exercise 3 times a week for 40 minutes on exercise bike. If you require additional information please contact our office.         Sincere

## (undated) NOTE — LETTER
7/31/2024              Stephanie Estradade        [address]        [facility] [address]         Dear Stephanie,    Medical Grade Compression Hose        Patient: Stephanie Cavanaugh      YOB: 1955           Diagnosis: Limb Swelling: M79.89       Compression: 20-30mmHg- Moderate  Style: Knee-High        Amount: X 2  HCPS: - Knee High          Physician Signature: _____________________  Date:  07/31/24      Sincerely,    Rajiv Ann, APRN

## (undated) NOTE — LETTER
Guille Swain, [address]  [address], [place]       01/09/20        Patient: Salma Askew   YOB: 1955   Date of Visit: 1/9/2020       Dear  Dr. Nilo Beltran MD,      Thank you for referring Salma Askew to my practice.   Please

## (undated) NOTE — LETTER
September 8, 2020    Selene Robledo MD  [address]  [address] [id number]     Patient: Katharine Smith   YOB: 1955   Date of Visit: 9/5/2020       Dear Dr. Edwin Peres MD:    Thank you for referring Katharine Smith to me for evaluation. Subjective visual disturbance of both eyes  Normal visual field, right eye. Nasal visual field defect, left eye. Please contact Dr. Ayde Mcfadden to have an MRI of brain and orbits with and without contrast to rule out compressive lesion of left optic nerve.

## (undated) NOTE — LETTER
March 5, 2020    Lizy May MD  [address]  [address] [address]     Patient: Sampson Lambert   YOB: 1955   Date of Visit: 3/5/2020       Dear Dr. Matias Angelo MD:    Thank you for referring Sampson Lambert to me for evaluation.  Here Current Outpatient Medications   Medication Sig Dispense Refill   • Meloxicam 15 MG Oral Tab Take 1 tablet (15 mg total) by mouth daily. 30 tablet 3   • Fluticasone Propionate 50 MCG/ACT Nasal Suspension by Each Nare route daily.      • amLODIPine Denilson Vitreous Vitreous floaters Vitreous floaters          Fundus Exam       Right Left    Disc Good rim, Temporal crescent Good rim, Temporal crescent    C/D Ratio 0.1 0.1    Macula Normal Normal    Vessels Normal Normal    Periphery Normal Normal flashes of light, loss of vision or curtain or veil effect.        Subjective visual disturbance of both eyes  Due to patient feeling like she has lost peripheral vision in the left eye, will have patient back in the near future for a 120 point visual field